# Patient Record
Sex: FEMALE | Race: WHITE | Employment: FULL TIME | URBAN - METROPOLITAN AREA
[De-identification: names, ages, dates, MRNs, and addresses within clinical notes are randomized per-mention and may not be internally consistent; named-entity substitution may affect disease eponyms.]

---

## 2017-02-01 ENCOUNTER — OFFICE VISIT (OUTPATIENT)
Dept: SURGERY | Facility: CLINIC | Age: 42
End: 2017-02-01

## 2017-02-01 VITALS
BODY MASS INDEX: 23.86 KG/M2 | RESPIRATION RATE: 16 BRPM | HEIGHT: 67 IN | WEIGHT: 152 LBS | HEART RATE: 82 BPM | TEMPERATURE: 98 F | DIASTOLIC BLOOD PRESSURE: 72 MMHG | SYSTOLIC BLOOD PRESSURE: 113 MMHG

## 2017-02-01 DIAGNOSIS — N30.10 INTERSTITIAL CYSTITIS: Primary | ICD-10-CM

## 2017-02-01 PROCEDURE — 99212 OFFICE O/P EST SF 10 MIN: CPT | Performed by: UROLOGY

## 2017-02-01 PROCEDURE — 51701 INSERT BLADDER CATHETER: CPT | Performed by: UROLOGY

## 2017-02-01 PROCEDURE — 99244 OFF/OP CNSLTJ NEW/EST MOD 40: CPT | Performed by: UROLOGY

## 2017-02-01 RX ORDER — NITROFURANTOIN 25; 75 MG/1; MG/1
100 CAPSULE ORAL NIGHTLY
Qty: 30 CAPSULE | Refills: 11 | Status: SHIPPED | OUTPATIENT
Start: 2017-02-01 | End: 2018-01-25 | Stop reason: ALTCHOICE

## 2017-02-01 NOTE — PROGRESS NOTES
[facility] Patient Status:  Outpatient    1975 MRN [id number]   Location [facility] Attending Megan Crane. [address] Day #  PCP No primary care provider on file.        UROLOGICAL CO primary care physician December 17, 2016 with a normal urinalysis at the same time patient is sexually active she states at times she can have mild pain with intercourse not usually though she states she has not had new onset sexual activity and she does s history of valvular heart disease, arrhythmias, rheumatic fever, dvt or PE, chest pain, shortness of breath or ankle swelling. The patient exercises regularly.   4.   The patient denies pulmonary complaints of cough, asthma, emphysema, bronchitis, pneumoni Temp: 98 °F (36.7 °C)   TempSrc: Oral   Resp: 16   Height: 5' 7\" (1.702 m)   Weight: 152 lb (68.947 kg)       The patient is a well-developed, well-nourished, white female in no apparent distress, appropriate for her stated age, alert, oriented x 3 and and then repeated 2013 asymptomatic for 3 years and now he has recurrence at length we discussed the benefits risks complications side effects reasons for nature of alternatives to cystoscopy hydrodistention patient wishes to proceed with this.   Finally pa

## 2017-02-02 ENCOUNTER — TELEPHONE (OUTPATIENT)
Dept: FAMILY MEDICINE CLINIC | Facility: CLINIC | Age: 42
End: 2017-02-02

## 2017-02-02 DIAGNOSIS — Z12.39 BREAST CANCER SCREENING, HIGH RISK PATIENT: Primary | ICD-10-CM

## 2017-02-02 DIAGNOSIS — R92.2 DENSE BREASTS: ICD-10-CM

## 2017-02-03 NOTE — TELEPHONE ENCOUNTER
Left voicemail for patient to inform her that Dr Quinton Spears is out of the office and her request will be handled upon his return.

## 2017-02-06 NOTE — TELEPHONE ENCOUNTER
Rory Reed, I am not certain I am ordering the correct type of mammogram screening for this pt you have evaluated for high risk breast cancer and dense breasts.  Is it tomogram screening mammogram? Your consult note adds \"3D\" but the only choices for that

## 2017-02-07 ENCOUNTER — TELEPHONE (OUTPATIENT)
Dept: FAMILY MEDICINE CLINIC | Facility: CLINIC | Age: 42
End: 2017-02-07

## 2017-02-07 NOTE — TELEPHONE ENCOUNTER
Refer to 2/2/17 orders call also. Will mail to patient. Patient informed. Demographics corrected. She lives in [facility].

## 2017-02-16 NOTE — TELEPHONE ENCOUNTER
WINTCALEX for pt. Please let her know [facility] approved 3D mammo order she can call Central Scheduling at [phone] to schedule appt.

## 2017-02-24 ENCOUNTER — TELEPHONE (OUTPATIENT)
Dept: SURGERY | Facility: CLINIC | Age: 42
End: 2017-02-24

## 2017-02-24 DIAGNOSIS — R30.0 DYSURIA: Primary | ICD-10-CM

## 2017-02-24 NOTE — TELEPHONE ENCOUNTER
Spoke to patient. Relayed Dr. Jodie Gallardo message below to patient. Patient is aware to submit urine specimen at lab first, then she can take Macrobid 100 mg by mouth twice daily for 3 days, call Monday for results of urine tests.  Patient verbalized understand

## 2017-02-24 NOTE — TELEPHONE ENCOUNTER
Patient c/o dysuria, urgency with scant amount of urine only, bladder pressure/pain; onset 1 day ago. Patient states she had sexual activity this past weekend and did not take the Macrobid as prescribed.    Patient states she normally takes Macrobid 100 mg

## 2017-02-24 NOTE — TELEPHONE ENCOUNTER
Please contact patient and if she wants she can simply take 3 days of Macrobid 100 mg twice daily.   We will also await results of urinalysis and culture as recommended by nursing staff which I agree with

## 2017-02-24 NOTE — TELEPHONE ENCOUNTER
pt called. She has IC, she had an sexual encounter last weekend and thinks she has an infection coming on. Feeling irritated in vaginal area. She asked if she could take more then one Macrobid for the next few days. Please advise.

## 2017-02-27 ENCOUNTER — APPOINTMENT (OUTPATIENT)
Dept: LAB | Facility: HOSPITAL | Age: 42
End: 2017-02-27
Attending: UROLOGY
Payer: COMMERCIAL

## 2017-02-27 DIAGNOSIS — R30.0 DYSURIA: ICD-10-CM

## 2017-02-27 LAB
BILIRUB UR QL: NEGATIVE
COLOR UR: YELLOW
GLUCOSE UR-MCNC: NEGATIVE MG/DL
KETONES UR-MCNC: NEGATIVE MG/DL
NITRITE UR QL STRIP.AUTO: NEGATIVE
PH UR: 5 [PH] (ref 5–8)
PROT UR-MCNC: NEGATIVE MG/DL
RBC #/AREA URNS AUTO: 4 /HPF
SP GR UR STRIP: 1.02 (ref 1–1.03)
UROBILINOGEN UR STRIP-ACNC: <2
VIT C UR-MCNC: NEGATIVE MG/DL
WBC #/AREA URNS AUTO: 28 /HPF

## 2017-02-27 PROCEDURE — 87086 URINE CULTURE/COLONY COUNT: CPT

## 2017-02-27 PROCEDURE — 81001 URINALYSIS AUTO W/SCOPE: CPT

## 2017-02-27 PROCEDURE — 87077 CULTURE AEROBIC IDENTIFY: CPT

## 2017-02-28 ENCOUNTER — TELEPHONE (OUTPATIENT)
Dept: SURGERY | Facility: CLINIC | Age: 42
End: 2017-02-28

## 2017-03-01 ENCOUNTER — TELEPHONE (OUTPATIENT)
Dept: SURGERY | Facility: CLINIC | Age: 42
End: 2017-03-01

## 2017-03-01 RX ORDER — SULFAMETHOXAZOLE AND TRIMETHOPRIM 800; 160 MG/1; MG/1
1 TABLET ORAL 2 TIMES DAILY
Qty: 14 TABLET | Refills: 0 | Status: SHIPPED | OUTPATIENT
Start: 2017-03-01 | End: 2017-03-08

## 2017-03-01 NOTE — TELEPHONE ENCOUNTER
----- Message from Demi Vargas MD sent at 3/1/2017  8:10 AM CST -----  Please contact patient with low level UTI although this is commonly a skin contaminant I would recommend short course of therapy Bactrim DS 1 p.o. twice daily #14 one refill can be c

## 2017-03-01 NOTE — TELEPHONE ENCOUNTER
Spoke with pt and informed her of [facility] results msg below and then I realized that [facility] did not send the bactrim ds and also pt is on nightly suppressive tx with macrobid 100mg 1 tab nightly.  [facility] happened to be standing nearby so I I placed pt on hold and juan

## 2017-03-03 ENCOUNTER — HOSPITAL ENCOUNTER (OUTPATIENT)
Dept: MAMMOGRAPHY | Facility: HOSPITAL | Age: 42
Discharge: HOME | End: 2017-03-03
Attending: FAMILY MEDICINE
Payer: COMMERCIAL

## 2017-03-03 DIAGNOSIS — R92.2 DENSE BREASTS: ICD-10-CM

## 2017-03-03 DIAGNOSIS — Z12.39 BREAST CANCER SCREENING, HIGH RISK PATIENT: ICD-10-CM

## 2017-03-03 PROCEDURE — 77066 DX MAMMO INCL CAD BI: CPT | Performed by: RADIOLOGY

## 2017-03-03 PROCEDURE — 77062 BREAST TOMOSYNTHESIS BI: CPT | Performed by: RADIOLOGY

## 2017-03-04 ENCOUNTER — TELEPHONE (OUTPATIENT)
Dept: FAMILY MEDICINE CLINIC | Facility: CLINIC | Age: 42
End: 2017-03-04

## 2017-03-04 NOTE — TELEPHONE ENCOUNTER
----- Message from Benji Bella MD sent at 3/3/2017 12:54 PM CST -----  Call pt. Mammogram shows no evidence of breast cancer. She has multiple cysts, these are NOT new.  Due to having dense breasts, she should get automated whole breast ultrasound wit

## 2017-03-07 ENCOUNTER — TELEPHONE (OUTPATIENT)
Dept: SURGERY | Facility: CLINIC | Age: 42
End: 2017-03-07

## 2017-03-07 DIAGNOSIS — N30.00 ACUTE CYSTITIS WITHOUT HEMATURIA: Primary | ICD-10-CM

## 2017-03-07 NOTE — TELEPHONE ENCOUNTER
It is a antibiotic she had a reaction to it broke out in hives, patient is scheduled for surg Friday with dr Oumou Khan, please advise and call patient

## 2017-03-08 ENCOUNTER — APPOINTMENT (OUTPATIENT)
Dept: LAB | Facility: HOSPITAL | Age: 42
End: 2017-03-08
Attending: UROLOGY
Payer: COMMERCIAL

## 2017-03-08 ENCOUNTER — TELEPHONE (OUTPATIENT)
Dept: SURGERY | Facility: CLINIC | Age: 42
End: 2017-03-08

## 2017-03-08 DIAGNOSIS — N30.00 ACUTE CYSTITIS WITHOUT HEMATURIA: ICD-10-CM

## 2017-03-08 LAB
BILIRUB UR QL: NEGATIVE
COLOR UR: YELLOW
GLUCOSE UR-MCNC: NEGATIVE MG/DL
HGB UR QL STRIP.AUTO: NEGATIVE
KETONES UR-MCNC: 20 MG/DL
NITRITE UR QL STRIP.AUTO: NEGATIVE
PH UR: 6 [PH] (ref 5–8)
PROT UR-MCNC: NEGATIVE MG/DL
RBC #/AREA URNS AUTO: 2 /HPF
SP GR UR STRIP: 1.01 (ref 1–1.03)
UROBILINOGEN UR STRIP-ACNC: <2
VIT C UR-MCNC: NEGATIVE MG/DL
WBC #/AREA URNS AUTO: 2 /HPF

## 2017-03-08 PROCEDURE — 87086 URINE CULTURE/COLONY COUNT: CPT

## 2017-03-08 PROCEDURE — 81001 URINALYSIS AUTO W/SCOPE: CPT

## 2017-03-08 NOTE — TELEPHONE ENCOUNTER
Please contact patient and 5 days of Bactrim is probably enough to treat urinary tract infection however we would have to paulina patient allergic to Bactrim and not use it any longer in the future.   Patient could come in today and submit repeat UA and cultur

## 2017-03-08 NOTE — TELEPHONE ENCOUNTER
Pt returning Rn's call re: allergic reaction to meds. Pt states Rn was waiting for 's response on what the next step will be? Please advise, thank you.  ( please see closed TE 3/07)

## 2017-03-08 NOTE — TELEPHONE ENCOUNTER
I spoke with pt and informed her of [facility]'s response msg below and and she said that she will try to get to the lab at the [facility] this evening because she will need her son to drive her because she took another Benadryl.  I asked if she was having symptoms still

## 2017-03-08 NOTE — TELEPHONE ENCOUNTER
Spoke to patient. Patient c/o hives, itchy hives all over body; onset this morning. Patient states the only thing new is that she started Bactrim DS about 5 days ago for low level UTI. Patient states she has two more days left of antibiotic to take.   Yaneth

## 2017-03-09 NOTE — H&P
[facility]    PATIENT'S NAME: Sofyadevon Jemal   ATTENDING PHYSICIAN: Brook Mukherjee.  Gerhardt Ditty, MD   PATIENT ACCOUNT#:   [de-identified]    LOCATION:  [facility]  MEDICAL RECORD #:   [id number]       YOB: 1975  ADMISSION DATE:       03/10/2017    HI tubal ligation in her 25s. MEDICATIONS:  Vitamin D and multivitamin. ALLERGIES:  Penicillin, tramadol, caused itching.     SOCIAL HISTORY:  She was a previous smoker, 1/2 a pack a day for 10 years, quitting 2 years ago; however, restarted about a year

## 2017-03-09 NOTE — TELEPHONE ENCOUNTER
Contacted patient. Relayed Dr. Briana Jones message below to patient. Patient verbalized understanding. All questions answered.

## 2017-03-09 NOTE — TELEPHONE ENCOUNTER
Please see 3/7/17 Telephone Encounter for MD's order and plan. Patient is calling for her repeat urinalysis and urine culture results.

## 2017-03-09 NOTE — TELEPHONE ENCOUNTER
Please contact patient that urinalysis is much improved so I think we can proceed with cystoscopy culture however not available as of yet it probably at least will have a preliminary in the morning prior to surgery but nothing available today.   Nonetheless

## 2017-03-10 ENCOUNTER — ANESTHESIA EVENT (OUTPATIENT)
Dept: SURGERY | Facility: HOSPITAL | Age: 42
End: 2017-03-10
Payer: COMMERCIAL

## 2017-03-10 ENCOUNTER — SURGERY (OUTPATIENT)
Age: 42
End: 2017-03-10

## 2017-03-10 ENCOUNTER — HOSPITAL ENCOUNTER (OUTPATIENT)
Facility: HOSPITAL | Age: 42
Setting detail: HOSPITAL OUTPATIENT SURGERY
Discharge: HOME | End: 2017-03-10
Attending: UROLOGY | Admitting: UROLOGY
Payer: COMMERCIAL

## 2017-03-10 ENCOUNTER — ANESTHESIA (OUTPATIENT)
Dept: SURGERY | Facility: HOSPITAL | Age: 42
End: 2017-03-10
Payer: COMMERCIAL

## 2017-03-10 VITALS
OXYGEN SATURATION: 99 % | HEART RATE: 64 BPM | BODY MASS INDEX: 22.13 KG/M2 | DIASTOLIC BLOOD PRESSURE: 56 MMHG | SYSTOLIC BLOOD PRESSURE: 91 MMHG | WEIGHT: 141 LBS | HEIGHT: 67 IN | TEMPERATURE: 97 F | RESPIRATION RATE: 14 BRPM

## 2017-03-10 DIAGNOSIS — N30.10 INTERSTITIAL CYSTITIS: Primary | ICD-10-CM

## 2017-03-10 LAB — B-HCG UR QL: NEGATIVE

## 2017-03-10 PROCEDURE — 52260 CYSTOSCOPY AND TREATMENT: CPT | Performed by: UROLOGY

## 2017-03-10 PROCEDURE — 0T7B8ZZ DILATION OF BLADDER, VIA NATURAL OR ARTIFICIAL OPENING ENDOSCOPIC: ICD-10-PCS | Performed by: UROLOGY

## 2017-03-10 RX ORDER — HYDROMORPHONE HYDROCHLORIDE 1 MG/ML
0.6 INJECTION, SOLUTION INTRAMUSCULAR; INTRAVENOUS; SUBCUTANEOUS EVERY 5 MIN PRN
Status: DISCONTINUED | OUTPATIENT
Start: 2017-03-10 | End: 2017-03-10

## 2017-03-10 RX ORDER — METOCLOPRAMIDE 10 MG/1
10 TABLET ORAL ONCE
Status: DISCONTINUED | OUTPATIENT
Start: 2017-03-10 | End: 2017-03-10 | Stop reason: HOSPADM

## 2017-03-10 RX ORDER — HYDROCODONE BITARTRATE AND ACETAMINOPHEN 5; 325 MG/1; MG/1
2 TABLET ORAL AS NEEDED
Status: DISCONTINUED | OUTPATIENT
Start: 2017-03-10 | End: 2017-03-10

## 2017-03-10 RX ORDER — MORPHINE SULFATE 4 MG/ML
4 INJECTION, SOLUTION INTRAMUSCULAR; INTRAVENOUS EVERY 10 MIN PRN
Status: DISCONTINUED | OUTPATIENT
Start: 2017-03-10 | End: 2017-03-10

## 2017-03-10 RX ORDER — LIDOCAINE HYDROCHLORIDE 20 MG/ML
JELLY TOPICAL AS NEEDED
Status: DISCONTINUED | OUTPATIENT
Start: 2017-03-10 | End: 2017-03-10 | Stop reason: HOSPADM

## 2017-03-10 RX ORDER — KETOROLAC TROMETHAMINE 30 MG/ML
INJECTION, SOLUTION INTRAMUSCULAR; INTRAVENOUS AS NEEDED
Status: DISCONTINUED | OUTPATIENT
Start: 2017-03-10 | End: 2017-03-10 | Stop reason: SURG

## 2017-03-10 RX ORDER — FAMOTIDINE 20 MG/1
20 TABLET ORAL ONCE
Status: DISCONTINUED | OUTPATIENT
Start: 2017-03-10 | End: 2017-03-10 | Stop reason: HOSPADM

## 2017-03-10 RX ORDER — MORPHINE SULFATE 2 MG/ML
2 INJECTION, SOLUTION INTRAMUSCULAR; INTRAVENOUS EVERY 10 MIN PRN
Status: DISCONTINUED | OUTPATIENT
Start: 2017-03-10 | End: 2017-03-10

## 2017-03-10 RX ORDER — HYDROMORPHONE HYDROCHLORIDE 1 MG/ML
0.2 INJECTION, SOLUTION INTRAMUSCULAR; INTRAVENOUS; SUBCUTANEOUS EVERY 5 MIN PRN
Status: DISCONTINUED | OUTPATIENT
Start: 2017-03-10 | End: 2017-03-10

## 2017-03-10 RX ORDER — LEVOFLOXACIN 5 MG/ML
500 INJECTION, SOLUTION INTRAVENOUS EVERY 24 HOURS
Status: DISCONTINUED | OUTPATIENT
Start: 2017-03-10 | End: 2017-03-10 | Stop reason: HOSPADM

## 2017-03-10 RX ORDER — HYDROCODONE BITARTRATE AND ACETAMINOPHEN 5; 325 MG/1; MG/1
1 TABLET ORAL AS NEEDED
Status: DISCONTINUED | OUTPATIENT
Start: 2017-03-10 | End: 2017-03-10

## 2017-03-10 RX ORDER — SODIUM CHLORIDE, SODIUM LACTATE, POTASSIUM CHLORIDE, CALCIUM CHLORIDE 600; 310; 30; 20 MG/100ML; MG/100ML; MG/100ML; MG/100ML
INJECTION, SOLUTION INTRAVENOUS CONTINUOUS PRN
Status: DISCONTINUED | OUTPATIENT
Start: 2017-03-10 | End: 2017-03-10 | Stop reason: SURG

## 2017-03-10 RX ORDER — ACETAMINOPHEN 325 MG/1
650 TABLET ORAL ONCE
Status: DISCONTINUED | OUTPATIENT
Start: 2017-03-10 | End: 2017-03-10 | Stop reason: HOSPADM

## 2017-03-10 RX ORDER — SODIUM CHLORIDE, SODIUM LACTATE, POTASSIUM CHLORIDE, CALCIUM CHLORIDE 600; 310; 30; 20 MG/100ML; MG/100ML; MG/100ML; MG/100ML
INJECTION, SOLUTION INTRAVENOUS CONTINUOUS
Status: DISCONTINUED | OUTPATIENT
Start: 2017-03-10 | End: 2017-03-10

## 2017-03-10 RX ORDER — DEXAMETHASONE SODIUM PHOSPHATE 4 MG/ML
VIAL (ML) INJECTION AS NEEDED
Status: DISCONTINUED | OUTPATIENT
Start: 2017-03-10 | End: 2017-03-10 | Stop reason: SURG

## 2017-03-10 RX ORDER — LIDOCAINE HYDROCHLORIDE 10 MG/ML
INJECTION, SOLUTION EPIDURAL; INFILTRATION; INTRACAUDAL; PERINEURAL AS NEEDED
Status: DISCONTINUED | OUTPATIENT
Start: 2017-03-10 | End: 2017-03-10 | Stop reason: SURG

## 2017-03-10 RX ORDER — HYDROMORPHONE HYDROCHLORIDE 1 MG/ML
0.4 INJECTION, SOLUTION INTRAMUSCULAR; INTRAVENOUS; SUBCUTANEOUS EVERY 5 MIN PRN
Status: DISCONTINUED | OUTPATIENT
Start: 2017-03-10 | End: 2017-03-10

## 2017-03-10 RX ORDER — MORPHINE SULFATE 10 MG/ML
6 INJECTION, SOLUTION INTRAMUSCULAR; INTRAVENOUS EVERY 10 MIN PRN
Status: DISCONTINUED | OUTPATIENT
Start: 2017-03-10 | End: 2017-03-10

## 2017-03-10 RX ORDER — ONDANSETRON 2 MG/ML
INJECTION INTRAMUSCULAR; INTRAVENOUS AS NEEDED
Status: DISCONTINUED | OUTPATIENT
Start: 2017-03-10 | End: 2017-03-10 | Stop reason: SURG

## 2017-03-10 RX ORDER — MIDAZOLAM HYDROCHLORIDE 1 MG/ML
INJECTION INTRAMUSCULAR; INTRAVENOUS AS NEEDED
Status: DISCONTINUED | OUTPATIENT
Start: 2017-03-10 | End: 2017-03-10 | Stop reason: SURG

## 2017-03-10 RX ORDER — NALOXONE HYDROCHLORIDE 0.4 MG/ML
80 INJECTION, SOLUTION INTRAMUSCULAR; INTRAVENOUS; SUBCUTANEOUS AS NEEDED
Status: DISCONTINUED | OUTPATIENT
Start: 2017-03-10 | End: 2017-03-10

## 2017-03-10 RX ORDER — ONDANSETRON 2 MG/ML
4 INJECTION INTRAMUSCULAR; INTRAVENOUS ONCE AS NEEDED
Status: DISCONTINUED | OUTPATIENT
Start: 2017-03-10 | End: 2017-03-10

## 2017-03-10 RX ORDER — CIPROFLOXACIN 500 MG/1
500 TABLET, FILM COATED ORAL 2 TIMES DAILY
Qty: 10 TABLET | Refills: 0 | Status: SHIPPED | OUTPATIENT
Start: 2017-03-10 | End: 2017-03-15

## 2017-03-10 RX ORDER — GLYCOPYRROLATE 0.2 MG/ML
INJECTION INTRAMUSCULAR; INTRAVENOUS AS NEEDED
Status: DISCONTINUED | OUTPATIENT
Start: 2017-03-10 | End: 2017-03-10 | Stop reason: SURG

## 2017-03-10 RX ADMIN — SODIUM CHLORIDE, SODIUM LACTATE, POTASSIUM CHLORIDE, CALCIUM CHLORIDE: 600; 310; 30; 20 INJECTION, SOLUTION INTRAVENOUS at 09:07:00

## 2017-03-10 RX ADMIN — MIDAZOLAM HYDROCHLORIDE 1 MG: 1 INJECTION INTRAMUSCULAR; INTRAVENOUS at 09:07:00

## 2017-03-10 RX ADMIN — LIDOCAINE HYDROCHLORIDE 50 MG: 10 INJECTION, SOLUTION EPIDURAL; INFILTRATION; INTRACAUDAL; PERINEURAL at 09:09:00

## 2017-03-10 RX ADMIN — GLYCOPYRROLATE 0.2 MG: 0.2 INJECTION INTRAMUSCULAR; INTRAVENOUS at 09:09:00

## 2017-03-10 RX ADMIN — KETOROLAC TROMETHAMINE 30 MG: 30 INJECTION, SOLUTION INTRAMUSCULAR; INTRAVENOUS at 09:07:00

## 2017-03-10 RX ADMIN — ONDANSETRON 4 MG: 2 INJECTION INTRAMUSCULAR; INTRAVENOUS at 09:09:00

## 2017-03-10 RX ADMIN — SODIUM CHLORIDE, SODIUM LACTATE, POTASSIUM CHLORIDE, CALCIUM CHLORIDE: 600; 310; 30; 20 INJECTION, SOLUTION INTRAVENOUS at 09:40:00

## 2017-03-10 RX ADMIN — DEXAMETHASONE SODIUM PHOSPHATE 4 MG: 4 MG/ML VIAL (ML) INJECTION at 09:09:00

## 2017-03-10 NOTE — ANESTHESIA POSTPROCEDURE EVALUATION
Patient: Doug Arceo    Procedure Summary     Date Anesthesia Start Anesthesia Stop Room / Location    03/10/17 0906  [facility] OR 14 / [facility] OR       Procedure Diagnosis Surgeon Responsible Provider    CYSTOSCOPY (N/A ) (interstitial cystitis) Cinel,

## 2017-03-10 NOTE — OPERATIVE REPORT
[facility] OPERATING ROOM  Operative Note     Camacho Adis Location: OR   CSN [id number] MRN [id number]   Admission Date 3/10/2017 Operation Date 3/10/2017   Attending Physician Donnetta Burkitt, MD Operating Physician Christiano Martinez.  Melanie Mc MD      Preoperat stones noted ureteral orifices were normal in size and position with clear reflux at this point keeping the sheath and we took the lens out of the cystoscope and performed hydrodistention yet which point stream of the irrigation stopped at 1100 cc this was

## 2017-03-10 NOTE — INTERVAL H&P NOTE
Pre-op Diagnosis: interstitial cystitis    The above referenced H&P was reviewed by Magnus Veras. Aria Ospina MD on 3/10/2017, the patient was examined and no significant changes have occurred in the patient's condition since the H&P was performed.   I discussed with

## 2017-03-10 NOTE — ANESTHESIA PREPROCEDURE EVALUATION
Anesthesia PreOp Note    HPI:     Davey Hargrove is a 42year old female who presents for preoperative consultation requested by: Jake Martin MD    Date of Surgery: 3/10/2017    Procedure(s):  CYSTOSCOPY  Indication: interstitial cystitis    * No Diagno Intravenous Q24H Christiano Martin MD     No current [facility]-ordered outpatient prescriptions on file.       Bactrim Ds [Sulfame*    Hives, Itching  Penicillins               Tramadol                Itching    Family History   Problem Relation Age of Onset   • B Patient      I have informed Elisabet Parker  of the nature of the anesthetic plan, benefits, risks, major complications, and any alternative forms of anesthetic management. All of the patient's questions were answered to the best of my ability.  The patien

## 2017-03-11 ENCOUNTER — HOSPITAL ENCOUNTER (EMERGENCY)
Facility: HOSPITAL | Age: 42
Discharge: HOME | End: 2017-03-11
Attending: EMERGENCY MEDICINE
Payer: COMMERCIAL

## 2017-03-11 VITALS
TEMPERATURE: 99 F | HEIGHT: 67 IN | SYSTOLIC BLOOD PRESSURE: 93 MMHG | BODY MASS INDEX: 22.6 KG/M2 | DIASTOLIC BLOOD PRESSURE: 52 MMHG | HEART RATE: 64 BPM | RESPIRATION RATE: 18 BRPM | OXYGEN SATURATION: 100 % | WEIGHT: 144 LBS

## 2017-03-11 DIAGNOSIS — T78.40XA ALLERGIC REACTION, INITIAL ENCOUNTER: Primary | ICD-10-CM

## 2017-03-11 PROCEDURE — S0028 INJECTION, FAMOTIDINE, 20 MG: HCPCS | Performed by: EMERGENCY MEDICINE

## 2017-03-11 PROCEDURE — 96375 TX/PRO/DX INJ NEW DRUG ADDON: CPT

## 2017-03-11 PROCEDURE — 99284 EMERGENCY DEPT VISIT MOD MDM: CPT

## 2017-03-11 PROCEDURE — 96374 THER/PROPH/DIAG INJ IV PUSH: CPT

## 2017-03-11 RX ORDER — METHYLPREDNISOLONE SODIUM SUCCINATE 125 MG/2ML
125 INJECTION, POWDER, LYOPHILIZED, FOR SOLUTION INTRAMUSCULAR; INTRAVENOUS ONCE
Status: COMPLETED | OUTPATIENT
Start: 2017-03-11 | End: 2017-03-11

## 2017-03-11 RX ORDER — FAMOTIDINE 20 MG
20 TABLET ORAL 2 TIMES DAILY
Qty: 20 TABLET | Refills: 0 | Status: SHIPPED | OUTPATIENT
Start: 2017-03-11 | End: 2017-03-21

## 2017-03-11 RX ORDER — FAMOTIDINE 10 MG/ML
20 INJECTION, SOLUTION INTRAVENOUS ONCE
Status: COMPLETED | OUTPATIENT
Start: 2017-03-11 | End: 2017-03-11

## 2017-03-11 RX ORDER — PREDNISONE 20 MG/1
40 TABLET ORAL DAILY
Qty: 10 TABLET | Refills: 0 | Status: SHIPPED | OUTPATIENT
Start: 2017-03-11 | End: 2017-03-16

## 2017-03-11 RX ORDER — DIPHENHYDRAMINE HYDROCHLORIDE 50 MG/ML
50 INJECTION INTRAMUSCULAR; INTRAVENOUS ONCE
Status: COMPLETED | OUTPATIENT
Start: 2017-03-11 | End: 2017-03-11

## 2017-03-11 RX ORDER — CETIRIZINE HYDROCHLORIDE 10 MG/1
10 TABLET ORAL 2 TIMES DAILY
Qty: 20 TABLET | Refills: 0 | Status: SHIPPED | OUTPATIENT
Start: 2017-03-11 | End: 2017-03-21

## 2017-03-11 NOTE — ED INITIAL ASSESSMENT (HPI)
Hives to body since yesterday, now with facial swelling and swelling to lips. C/o throat tightness, now better.

## 2017-03-11 NOTE — ED NOTES
Pt was Dx with UTI and was given bactrim and she noticed a rash, then she had surgery the following day, then today woke up with facial swelling, throat swelling and hive all over her body. Pt took benadryl at home which helped relive some of her symptoms.

## 2017-03-11 NOTE — ED NOTES
Upon enertiong room pt states \"i feel like the hives are starting to come back, increased itching and burning. \"  Denies any respiratory distress

## 2017-03-12 NOTE — ED PROVIDER NOTES
Patient Seen in: [facility] Emergency Department    History   Patient presents with: Allergic Rxn Allergies (immune)      HPI    Patient presents complaining of allergic reaction symptoms. Generalized hives and itching.   Patient states symptoms Comment: occasionally    Drug Use: No            Other Topics            Concern  Caffeine Concern        Yes    Comment:coffee, 1x week        Review of Systems   Constitutional: Negative for fever and chills.    HENT: Negative for congestion and sore thro Skin is warm and dry. Rash noted. Generalized hives   Nursing note and vitals reviewed.       ED Course      Labs Reviewed - No data to display          MDM     Pulse Ox: 100%, Room air, Normal     Monitor Interpretation:   normal sinus rhythm    Differen

## 2017-04-14 ENCOUNTER — HOSPITAL ENCOUNTER (EMERGENCY)
Facility: HOSPITAL | Age: 42
Discharge: HOME | End: 2017-04-14
Attending: PHYSICIAN ASSISTANT
Payer: COMMERCIAL

## 2017-04-14 ENCOUNTER — APPOINTMENT (OUTPATIENT)
Dept: CT IMAGING | Facility: HOSPITAL | Age: 42
End: 2017-04-14
Attending: PHYSICIAN ASSISTANT
Payer: COMMERCIAL

## 2017-04-14 ENCOUNTER — TELEPHONE (OUTPATIENT)
Dept: FAMILY MEDICINE CLINIC | Facility: CLINIC | Age: 42
End: 2017-04-14

## 2017-04-14 VITALS
DIASTOLIC BLOOD PRESSURE: 64 MMHG | HEIGHT: 67 IN | SYSTOLIC BLOOD PRESSURE: 108 MMHG | WEIGHT: 140 LBS | OXYGEN SATURATION: 99 % | BODY MASS INDEX: 21.97 KG/M2 | TEMPERATURE: 98 F | HEART RATE: 68 BPM | RESPIRATION RATE: 20 BRPM

## 2017-04-14 DIAGNOSIS — R11.2 NAUSEA AND VOMITING IN ADULT: ICD-10-CM

## 2017-04-14 DIAGNOSIS — R51.9 ACUTE NONINTRACTABLE HEADACHE, UNSPECIFIED HEADACHE TYPE: Primary | ICD-10-CM

## 2017-04-14 DIAGNOSIS — S09.90XA CLOSED HEAD INJURY WITHOUT LOSS OF CONSCIOUSNESS, INITIAL ENCOUNTER: ICD-10-CM

## 2017-04-14 PROCEDURE — 70450 CT HEAD/BRAIN W/O DYE: CPT

## 2017-04-14 PROCEDURE — 96375 TX/PRO/DX INJ NEW DRUG ADDON: CPT

## 2017-04-14 PROCEDURE — 96374 THER/PROPH/DIAG INJ IV PUSH: CPT

## 2017-04-14 PROCEDURE — 96361 HYDRATE IV INFUSION ADD-ON: CPT

## 2017-04-14 PROCEDURE — 81025 URINE PREGNANCY TEST: CPT

## 2017-04-14 PROCEDURE — 99284 EMERGENCY DEPT VISIT MOD MDM: CPT

## 2017-04-14 RX ORDER — METOCLOPRAMIDE HYDROCHLORIDE 5 MG/ML
10 INJECTION INTRAMUSCULAR; INTRAVENOUS ONCE
Status: COMPLETED | OUTPATIENT
Start: 2017-04-14 | End: 2017-04-14

## 2017-04-14 RX ORDER — ONDANSETRON 4 MG/1
4 TABLET, ORALLY DISINTEGRATING ORAL EVERY 6 HOURS PRN
Qty: 10 TABLET | Refills: 0 | Status: SHIPPED | OUTPATIENT
Start: 2017-04-14 | End: 2017-04-17

## 2017-04-14 RX ORDER — KETOROLAC TROMETHAMINE 30 MG/ML
30 INJECTION, SOLUTION INTRAMUSCULAR; INTRAVENOUS ONCE
Status: COMPLETED | OUTPATIENT
Start: 2017-04-14 | End: 2017-04-14

## 2017-04-14 RX ORDER — ONDANSETRON 2 MG/ML
4 INJECTION INTRAMUSCULAR; INTRAVENOUS ONCE
Status: COMPLETED | OUTPATIENT
Start: 2017-04-14 | End: 2017-04-14

## 2017-04-14 RX ORDER — BUTALBITAL, ACETAMINOPHEN AND CAFFEINE 50; 325; 40 MG/1; MG/1; MG/1
1-2 TABLET ORAL EVERY 6 HOURS PRN
Qty: 14 TABLET | Refills: 0 | Status: SHIPPED | OUTPATIENT
Start: 2017-04-14 | End: 2017-04-21

## 2017-04-14 RX ORDER — DIPHENHYDRAMINE HYDROCHLORIDE 50 MG/ML
50 INJECTION INTRAMUSCULAR; INTRAVENOUS ONCE
Status: COMPLETED | OUTPATIENT
Start: 2017-04-14 | End: 2017-04-14

## 2017-04-14 NOTE — TELEPHONE ENCOUNTER
Pt will find someone to drive her to ED this morning or this afternoon around 3pm.  Clinical staff to make f/u call tomorrow. Pt alert with clear speech during our phone conversation.

## 2017-04-14 NOTE — TELEPHONE ENCOUNTER
Pt states that she banged her head on wall this Wednesday. Pt states that she has pain on the head and it throbs occasionally. Pt states that at times she is just sitting and the throb starts again.  Per pt states that she does not have a lump but is concer

## 2017-04-14 NOTE — TELEPHONE ENCOUNTER
Actions Requested: OV, however, pt was referred to the ED  Situation/Background   Problem: Hit head on the wall, having intermittent throbbing pain since    Onset: Wednesday   Associated Symptoms: Nausea, no emesis. Intermittent dizziness.    History of Kenneth

## 2017-04-14 NOTE — ED NOTES
Pt to ER with c/o rt sided headache and nausea/vomiting since Wed. Pt states she was playing around on her bed playing with her dog and hit her head on the wall behind her bed. Pt denies LOC. Pt also c/o intermittent dizziness.  Pt ambulating with steady ga

## 2017-04-14 NOTE — ED INITIAL ASSESSMENT (HPI)
Patient reports she accidentally hit her head against a wall on Wednesday, denies LOC, but felt foggy after.  Still with throbbing headache, nausea    Took tylenol at 7 am

## 2017-04-14 NOTE — ED PROVIDER NOTES
Patient Seen in: [facility] Emergency Department    History   Patient presents with:  Headache (neurologic)    Stated Complaint: Headache    HPI    70-year-old female presents with chief complaint of right-sided headache. Onset 2 days ago.   Joshua ovarian ca?    • Cancer Maternal Uncle 46     d.47; lung ca, smoker         Smoking Status: Current Every Day Smoker        Packs/Day: 0.50  Years:           Types: Cigarettes    Alcohol Use: Yes                Comment: occasionally      Review of Systems rebound tenderness or guarding. No organomegaly is noted. No guarding or peritoneal signs. Genitourinary: Not examined. Lymphatic: No gross lymphadenopathy noted. Musculoskeletal: Musculoskeletal system is grossly intact. There is no obvious deformity. 0

## 2017-04-15 NOTE — TELEPHONE ENCOUNTER
Actions Requested: S for [facility] , patient would like to know if she could get a different prescription for headache. Has f/u appt with j[facility] on Wednesday.  persistent headache, nausea, following head injury on 4/13/17, treated in ER 4/14/17 butalbital, zofran, hours of operation and will call back if needed. Patient was able to restate instructions in their own words, verbalizes understanding and agrees with plan.

## 2017-04-15 NOTE — TELEPHONE ENCOUNTER
Pt notified of below. Advised adequate hydration with advil. Pt verbalized understanding and denies questions at this time.

## 2017-04-15 NOTE — TELEPHONE ENCOUNTER
At this point I would CPM, ,also take 25mg benadryl twice daily sometimes that helps. May take 600mg advil three times daily if not nauseated.

## 2017-05-01 ENCOUNTER — OFFICE VISIT (OUTPATIENT)
Dept: FAMILY MEDICINE CLINIC | Facility: CLINIC | Age: 42
End: 2017-05-01

## 2017-05-01 ENCOUNTER — TELEPHONE (OUTPATIENT)
Dept: FAMILY MEDICINE CLINIC | Facility: CLINIC | Age: 42
End: 2017-05-01

## 2017-05-01 VITALS
RESPIRATION RATE: 16 BRPM | TEMPERATURE: 98 F | BODY MASS INDEX: 23.37 KG/M2 | HEART RATE: 78 BPM | DIASTOLIC BLOOD PRESSURE: 72 MMHG | HEIGHT: 65.5 IN | SYSTOLIC BLOOD PRESSURE: 111 MMHG | WEIGHT: 142 LBS

## 2017-05-01 DIAGNOSIS — R11.0 NAUSEA: ICD-10-CM

## 2017-05-01 DIAGNOSIS — Z80.3 FAMILY HISTORY OF BREAST CANCER: ICD-10-CM

## 2017-05-01 DIAGNOSIS — E55.9 VITAMIN D DEFICIENCY: ICD-10-CM

## 2017-05-01 DIAGNOSIS — N30.10 INTERSTITIAL CYSTITIS: ICD-10-CM

## 2017-05-01 DIAGNOSIS — Z00.00 ANNUAL PHYSICAL EXAM: Primary | ICD-10-CM

## 2017-05-01 DIAGNOSIS — F17.200 TOBACCO USE DISORDER: ICD-10-CM

## 2017-05-01 PROCEDURE — 99396 PREV VISIT EST AGE 40-64: CPT | Performed by: FAMILY MEDICINE

## 2017-05-01 RX ORDER — ONDANSETRON 8 MG/1
8 TABLET, ORALLY DISINTEGRATING ORAL EVERY 8 HOURS PRN
Qty: 30 TABLET | Refills: 3 | Status: SHIPPED | OUTPATIENT
Start: 2017-05-01 | End: 2018-01-25 | Stop reason: ALTCHOICE

## 2017-05-01 NOTE — PROGRESS NOTES
HPI:    Patient ID: Marvin Bacon is a 36year old female. Patient presents with:  Physical    HPI  Had allergic reaction before cystoscopy, Bactrim DS, and went to ER, face blew up. Head injury a few wk ago, hit head while playing with dog.    Had a mil tingle on sitting too long   Psychiatric/Behavioral: Negative for sleep disturbance and depressed mood. Current Outpatient Prescriptions:  Nitrofurantoin Monohyd Macro (MACROBID) 100 MG Oral Cap Take 1 capsule (100 mg total) by mouth nightly. diagnosis)  Interstitial cystitis  Nausea  Tobacco use disorder  Vitamin d deficiency  Family history of breast cancer  1. Annual physical exam  Will order labs, rtc fasting    2. Interstitial cystitis  Is being treated by urologist - Dr Carson Lagunas    3.  Nausea

## 2017-05-01 NOTE — TELEPHONE ENCOUNTER
Javy, I wanted to let you know that since you put Priti Seen on daily Macrobid for cystitis prevention she has had marked loss of appetite and some nausea. Would there be any other daily antibiotics she might use? She is allergic to sulfa and pencillin.

## 2017-05-02 NOTE — TELEPHONE ENCOUNTER
Suppressive therapy is usually with either daily Macrobid or daily Bactrim DS she has a sulfa allergy. Therefore I do not have much of the option for suppressive antibiotics.   Patient could simply come off suppressive therapy and treat UTIs on an as-neede

## 2017-05-03 NOTE — TELEPHONE ENCOUNTER
Yes, I want her to stay on the med in fact. If labs are all normal she can still stay on med as long as she is able to eat enough to maintain good nutrition and will be able to turn sideways without becoming invisible (ha ha).

## 2017-05-03 NOTE — TELEPHONE ENCOUNTER
Pt advised of recommendations below and she verbalized understanding, she states since being on the macrobid she has not had a uti and she would hate to discontinue and treat every uti, she asks if staying on the macrobid and using zofran for the nausea is

## 2017-05-03 NOTE — TELEPHONE ENCOUNTER
Pt informed of Dr BRYANT's response below and pt agrees with plan. Denies further questions/concerns at this time.

## 2017-05-03 NOTE — TELEPHONE ENCOUNTER
Let pt know that I spoke with Dr. Rosa Maria Rhodes, her urologist. The options would be to continue the preventive abx, Macrobid, or stop it and treat each individual UTI with a 7 day course of the abx each episode.  The only other suppressive abx used in this situati

## 2017-05-06 ENCOUNTER — LAB ENCOUNTER (OUTPATIENT)
Dept: LAB | Facility: HOSPITAL | Age: 42
End: 2017-05-06
Attending: FAMILY MEDICINE
Payer: COMMERCIAL

## 2017-05-06 DIAGNOSIS — Z00.00 ANNUAL PHYSICAL EXAM: ICD-10-CM

## 2017-05-06 PROCEDURE — 80053 COMPREHEN METABOLIC PANEL: CPT

## 2017-05-06 PROCEDURE — 82306 VITAMIN D 25 HYDROXY: CPT

## 2017-05-06 PROCEDURE — 81001 URINALYSIS AUTO W/SCOPE: CPT

## 2017-05-06 PROCEDURE — 85025 COMPLETE CBC W/AUTO DIFF WBC: CPT

## 2017-05-06 PROCEDURE — 80061 LIPID PANEL: CPT

## 2017-05-06 PROCEDURE — 36415 COLL VENOUS BLD VENIPUNCTURE: CPT

## 2017-05-06 PROCEDURE — 84443 ASSAY THYROID STIM HORMONE: CPT

## 2017-09-11 ENCOUNTER — OFFICE VISIT (OUTPATIENT)
Dept: OBGYN CLINIC | Facility: CLINIC | Age: 42
End: 2017-09-11

## 2017-09-11 ENCOUNTER — TELEPHONE (OUTPATIENT)
Dept: OBGYN CLINIC | Facility: CLINIC | Age: 42
End: 2017-09-11

## 2017-09-11 ENCOUNTER — APPOINTMENT (OUTPATIENT)
Dept: LAB | Facility: HOSPITAL | Age: 42
End: 2017-09-11
Attending: OBSTETRICS & GYNECOLOGY
Payer: COMMERCIAL

## 2017-09-11 VITALS
WEIGHT: 141 LBS | SYSTOLIC BLOOD PRESSURE: 108 MMHG | BODY MASS INDEX: 22.66 KG/M2 | HEIGHT: 66.25 IN | HEART RATE: 65 BPM | DIASTOLIC BLOOD PRESSURE: 69 MMHG

## 2017-09-11 DIAGNOSIS — R10.2 PELVIC PAIN: ICD-10-CM

## 2017-09-11 DIAGNOSIS — R10.32 LLQ PAIN: ICD-10-CM

## 2017-09-11 DIAGNOSIS — N92.3 INTERMENSTRUAL BLEEDING: ICD-10-CM

## 2017-09-11 DIAGNOSIS — Z12.4 SCREENING FOR MALIGNANT NEOPLASM OF CERVIX: ICD-10-CM

## 2017-09-11 DIAGNOSIS — N92.3 INTERMENSTRUAL BLEEDING: Primary | ICD-10-CM

## 2017-09-11 LAB
B-HCG UR QL: NEGATIVE
BILIRUB UR QL: NEGATIVE
COLOR UR: YELLOW
GLUCOSE UR-MCNC: NEGATIVE MG/DL
LEUKOCYTE ESTERASE UR QL STRIP.AUTO: NEGATIVE
NITRITE UR QL STRIP.AUTO: NEGATIVE
PH UR: 5 [PH] (ref 5–8)
PROT UR-MCNC: NEGATIVE MG/DL
RBC #/AREA URNS AUTO: 6 /HPF
SP GR UR STRIP: 1.03 (ref 1–1.03)
UROBILINOGEN UR STRIP-ACNC: 2
VIT C UR-MCNC: NEGATIVE MG/DL
WBC #/AREA URNS AUTO: 1 /HPF

## 2017-09-11 PROCEDURE — 81001 URINALYSIS AUTO W/SCOPE: CPT

## 2017-09-11 PROCEDURE — 36415 COLL VENOUS BLD VENIPUNCTURE: CPT

## 2017-09-11 PROCEDURE — 81025 URINE PREGNANCY TEST: CPT

## 2017-09-11 PROCEDURE — 99213 OFFICE O/P EST LOW 20 MIN: CPT | Performed by: OBSTETRICS & GYNECOLOGY

## 2017-09-11 PROCEDURE — 84443 ASSAY THYROID STIM HORMONE: CPT

## 2017-09-11 NOTE — H&P
HPI:  The patient is a 37 yo F who presents c/o pain after IC on Saturday. Patitent reports 5/10 dull pain in the LLQ that occurred after intercourse. Pain only with deep penetration. Occasionally has PCB.   She does note that the pain has been occurr Patti Comings Mother    • Cancer Maternal Uncle 64     tongue cancer-cause of death; d.57, ex-smoker   • Other [OTHER] Brother      intellectual disability   • Ovarian Cancer Maternal Aunt 45     d.45; ?cervical vs ovarian ca? • Cancer Maternal Uncle 46     d. problem. Diagnoses and all orders for this visit:    Intermenstrual bleeding  -     US PELVIS (TRANSVAGINAL PELVIS) (CPT=76830); Future  -     Cancel: TSH W REFLEX TO FREE T4; Future  -     CHLAMYDIA/GONOCOCCUS, ROSITA;  Future  -     TRICH VAG BY ROSITA; Futu

## 2017-09-13 LAB
C TRACH DNA SPEC QL NAA+PROBE: NEGATIVE
HPV I/H RISK 1 DNA SPEC QL NAA+PROBE: NEGATIVE
N GONORRHOEA DNA SPEC QL NAA+PROBE: NEGATIVE
T VAGINALIS RRNA SPEC QL NAA+PROBE: NEGATIVE

## 2017-09-19 ENCOUNTER — TELEPHONE (OUTPATIENT)
Dept: OBGYN CLINIC | Facility: CLINIC | Age: 42
End: 2017-09-19

## 2017-09-19 NOTE — TELEPHONE ENCOUNTER
----- Message from Fred Georges DO sent at 9/16/2017  1:01 PM CDT -----  Pap/hpv/gc/ct/trich neg.   Please notify of results

## 2017-09-26 ENCOUNTER — HOSPITAL ENCOUNTER (OUTPATIENT)
Dept: ULTRASOUND IMAGING | Facility: HOSPITAL | Age: 42
Discharge: HOME | End: 2017-09-26
Attending: OBSTETRICS & GYNECOLOGY
Payer: COMMERCIAL

## 2017-09-26 DIAGNOSIS — R10.32 LLQ PAIN: ICD-10-CM

## 2017-09-26 DIAGNOSIS — N92.3 INTERMENSTRUAL BLEEDING: ICD-10-CM

## 2017-09-26 PROCEDURE — 93975 VASCULAR STUDY: CPT | Performed by: OBSTETRICS & GYNECOLOGY

## 2017-09-26 PROCEDURE — 76830 TRANSVAGINAL US NON-OB: CPT | Performed by: OBSTETRICS & GYNECOLOGY

## 2017-09-26 PROCEDURE — 76856 US EXAM PELVIC COMPLETE: CPT | Performed by: OBSTETRICS & GYNECOLOGY

## 2017-09-27 ENCOUNTER — TELEPHONE (OUTPATIENT)
Dept: OBGYN CLINIC | Facility: CLINIC | Age: 42
End: 2017-09-27

## 2017-09-27 RX ORDER — ACETAMINOPHEN AND CODEINE PHOSPHATE 120; 12 MG/5ML; MG/5ML
0.35 SOLUTION ORAL DAILY
Qty: 28 TABLET | Refills: 11 | Status: SHIPPED | OUTPATIENT
Start: 2017-09-27 | End: 2017-10-25

## 2017-09-27 NOTE — TELEPHONE ENCOUNTER
Reviewed US  Results with patient. No sign of why  Patient having pain. She had IC last week and no PCB, however given intermenstrual bleeding would recommend considering OCs. She isn't a candidate for COCPs due to smoking history.   She also reports her

## 2017-10-07 ENCOUNTER — TELEPHONE (OUTPATIENT)
Dept: OBGYN CLINIC | Facility: CLINIC | Age: 42
End: 2017-10-07

## 2017-10-08 NOTE — TELEPHONE ENCOUNTER
Spoke with Drew Will after speaking with Dr. Carito Canela regarding safety of OCs. Dr Carito Canela states okay for POPs. NorqD was sent to [employer]. She will start with her next menses. Discussed daily usage and timing.

## 2017-10-23 ENCOUNTER — APPOINTMENT (OUTPATIENT)
Dept: CT IMAGING | Facility: HOSPITAL | Age: 42
End: 2017-10-23
Attending: EMERGENCY MEDICINE
Payer: COMMERCIAL

## 2017-10-23 ENCOUNTER — HOSPITAL ENCOUNTER (EMERGENCY)
Facility: HOSPITAL | Age: 42
Discharge: HOME | End: 2017-10-23
Attending: EMERGENCY MEDICINE
Payer: COMMERCIAL

## 2017-10-23 ENCOUNTER — NURSE TRIAGE (OUTPATIENT)
Dept: OTHER | Age: 42
End: 2017-10-23

## 2017-10-23 VITALS
WEIGHT: 140 LBS | HEART RATE: 73 BPM | DIASTOLIC BLOOD PRESSURE: 78 MMHG | TEMPERATURE: 99 F | RESPIRATION RATE: 17 BRPM | HEIGHT: 67 IN | OXYGEN SATURATION: 98 % | SYSTOLIC BLOOD PRESSURE: 113 MMHG | BODY MASS INDEX: 21.97 KG/M2

## 2017-10-23 DIAGNOSIS — R10.9 ABDOMINAL PAIN, ACUTE: Primary | ICD-10-CM

## 2017-10-23 PROCEDURE — 96374 THER/PROPH/DIAG INJ IV PUSH: CPT

## 2017-10-23 PROCEDURE — 81025 URINE PREGNANCY TEST: CPT

## 2017-10-23 PROCEDURE — 85025 COMPLETE CBC W/AUTO DIFF WBC: CPT | Performed by: EMERGENCY MEDICINE

## 2017-10-23 PROCEDURE — 81001 URINALYSIS AUTO W/SCOPE: CPT | Performed by: EMERGENCY MEDICINE

## 2017-10-23 PROCEDURE — 99284 EMERGENCY DEPT VISIT MOD MDM: CPT

## 2017-10-23 PROCEDURE — 80048 BASIC METABOLIC PNL TOTAL CA: CPT | Performed by: EMERGENCY MEDICINE

## 2017-10-23 PROCEDURE — 96375 TX/PRO/DX INJ NEW DRUG ADDON: CPT

## 2017-10-23 PROCEDURE — 74177 CT ABD & PELVIS W/CONTRAST: CPT | Performed by: EMERGENCY MEDICINE

## 2017-10-23 RX ORDER — ONDANSETRON 2 MG/ML
4 INJECTION INTRAMUSCULAR; INTRAVENOUS ONCE
Status: COMPLETED | OUTPATIENT
Start: 2017-10-23 | End: 2017-10-23

## 2017-10-23 RX ORDER — MORPHINE SULFATE 2 MG/ML
2 INJECTION, SOLUTION INTRAMUSCULAR; INTRAVENOUS ONCE
Status: COMPLETED | OUTPATIENT
Start: 2017-10-23 | End: 2017-10-23

## 2017-10-23 NOTE — TELEPHONE ENCOUNTER
Action Requested: Summary for Provider     []  Critical Lab, Recommendations Needed  [] Need Additional Advice  []   FYI    []   Need Orders  [] Need Medications Sent to Pharmacy  []  Other     SUMMARY: Advised pt to go to ER for evaluation.     Pt states s

## 2017-10-24 NOTE — ED PROVIDER NOTES
Patient Seen in: [facility] Emergency Department    History   Patient presents with:  Abdomen/Flank Pain (GI/)    Stated Complaint: Right lower abd pain.  Gallbladder removed    HPI    62-year-old female with history of cholecystitis, interstitia for pain, discharge and redness. Respiratory: Negative for cough, shortness of breath and wheezing. Cardiovascular: Negative for chest pain. Gastrointestinal: Positive for abdominal pain and nausea. Negative for diarrhea and vomiting.    Karley Garcias and time. 5/5 strength in b/l UEs and LEs, normal sensation in all extremities, normal finger to nose b/l, normal gait, no facial asymmetry, normal speech     Skin: Skin is warm and dry. No rash noted. She is not diaphoretic.    Psychiatric: She has a nor Green Auto Resulted    -RAINBOW DRAW LIGHT GREEN   Collection Time: 10/23/17  8:57 PM   Result Value Ref Range   Hold Lt Green Auto Resulted    -RAINBOW DRAW GOLD   Collection Time: 10/23/17  8:57 PM   Result Value Ref Range   Hold Gold Auto Resulted    -R pain and nausea  - I personally reviewed and interpreted all the ED vitals  - afebrile, hemodynamically stable  - I ordered and personally reviewed the labs and imaging and found electrolytes reassuring, no anemia, no leukocytosis, no bacteriuria, CT witho

## 2017-10-25 ENCOUNTER — TELEPHONE (OUTPATIENT)
Dept: GASTROENTEROLOGY | Facility: CLINIC | Age: 42
End: 2017-10-25

## 2017-10-25 ENCOUNTER — APPOINTMENT (OUTPATIENT)
Dept: LAB | Facility: HOSPITAL | Age: 42
End: 2017-10-25
Attending: INTERNAL MEDICINE
Payer: COMMERCIAL

## 2017-10-25 ENCOUNTER — OFFICE VISIT (OUTPATIENT)
Dept: GASTROENTEROLOGY | Facility: CLINIC | Age: 42
End: 2017-10-25

## 2017-10-25 VITALS
TEMPERATURE: 98 F | DIASTOLIC BLOOD PRESSURE: 72 MMHG | HEIGHT: 66.25 IN | BODY MASS INDEX: 21.8 KG/M2 | WEIGHT: 135.63 LBS | SYSTOLIC BLOOD PRESSURE: 102 MMHG | HEART RATE: 72 BPM

## 2017-10-25 DIAGNOSIS — R10.31 RLQ ABDOMINAL PAIN: Primary | ICD-10-CM

## 2017-10-25 DIAGNOSIS — R10.31 RLQ ABDOMINAL PAIN: ICD-10-CM

## 2017-10-25 PROCEDURE — 99204 OFFICE O/P NEW MOD 45 MIN: CPT | Performed by: INTERNAL MEDICINE

## 2017-10-25 PROCEDURE — 80053 COMPREHEN METABOLIC PANEL: CPT

## 2017-10-25 PROCEDURE — 36415 COLL VENOUS BLD VENIPUNCTURE: CPT

## 2017-10-25 PROCEDURE — 99212 OFFICE O/P EST SF 10 MIN: CPT | Performed by: INTERNAL MEDICINE

## 2017-10-25 RX ORDER — PEG-3350, SODIUM SULFATE, SODIUM CHLORIDE, POTASSIUM CHLORIDE, SODIUM ASCORBATE AND ASCORBIC ACID 7.5-2.691G
KIT ORAL
Qty: 1 EACH | Refills: 0 | Status: SHIPPED | OUTPATIENT
Start: 2017-10-25 | End: 2017-11-14 | Stop reason: ALTCHOICE

## 2017-10-25 NOTE — PATIENT INSTRUCTIONS
1. Schedule colonoscopy with Iv/conscious sedation with Dr. Eduardo Flores    2.  bowel prep from pharmacy (split moviprep )    3. Continue all medications for procedure    4. Read all bowel prep instructions carefully    5.  AVOID seeds, nuts, popcorn, r

## 2017-10-25 NOTE — H&P
[facility] - Gastroenterology                                                                                                  Clinic History and Physical Types: Cigarettes  Smokeless tobacco: Current User                    Alcohol use:  Yes              Comment: occasionally       Medications (Active prior to today's visit):    Current Outpatient Prescriptions:  PEG-KCl-NaCl-NaSulf-Na Asc-C (MOVIPREP) without diarrhea seems less likely infectious, though worth further investigation with colonoscopy. If not colon related, discussed the possibilities of constipation, abdominal wall pain or potentially back/spine related given the description.     [place]

## 2017-10-25 NOTE — TELEPHONE ENCOUNTER
Scheduled for:  Colonoscopy 04128  Provider Name: Sam Alvarez  Date: 11/16/17   Location:  [facility]  Sedation:  IVCS  Time:  0730 / ARRIVAL 0630  Prep: MOVIPREP  Meds/Allergies Reconciled?:  Physician reviewed  Diagnosis with codes:  RLQ abdominal pain R10.31

## 2017-10-26 ENCOUNTER — TELEPHONE (OUTPATIENT)
Dept: GASTROENTEROLOGY | Facility: CLINIC | Age: 42
End: 2017-10-26

## 2017-10-26 DIAGNOSIS — R19.7 DIARRHEA, UNSPECIFIED TYPE: Primary | ICD-10-CM

## 2017-10-26 NOTE — TELEPHONE ENCOUNTER
Pt contacted and states she had a BM this morning that was dark black and \"very very soft. \"     Then had scrambled eggs, coffee, and green tea for breakfast and soon after she had right sided flank pain rated 11/10- she had another BM which was semi-form

## 2017-10-26 NOTE — TELEPHONE ENCOUNTER
Pt. States that she is concerned about having black soft stool this morning and just know pt. Had dark diarrhea, which had blood in it. Please call to discuss.

## 2017-10-26 NOTE — TELEPHONE ENCOUNTER
Please get stool studies (c.diff, stool culture, giardia).  If continued bleeding for days or multiple times in one day or dizziness/lightheadedness, chest pain, shortness of breath, uncontrollable/untolerable abdominal pain, recommend ED evaluation    [facility]

## 2017-10-26 NOTE — TELEPHONE ENCOUNTER
Pt contacted and reviewed Dr. Kassandra Buttp message below, she verbalized understanding and will go to the [facility] to get specimen cups tomorrow morning. She understands that if sxs worsen, she needs to present to the ED for further evaluation.

## 2017-10-27 ENCOUNTER — LAB ENCOUNTER (OUTPATIENT)
Dept: LAB | Age: 42
End: 2017-10-27
Attending: INTERNAL MEDICINE
Payer: COMMERCIAL

## 2017-10-27 DIAGNOSIS — R19.7 DIARRHEA, UNSPECIFIED TYPE: ICD-10-CM

## 2017-10-27 PROCEDURE — 87045 FECES CULTURE AEROBIC BACT: CPT

## 2017-10-27 PROCEDURE — 87046 STOOL CULTR AEROBIC BACT EA: CPT

## 2017-10-27 PROCEDURE — 87493 C DIFF AMPLIFIED PROBE: CPT

## 2017-10-27 PROCEDURE — 87329 GIARDIA AG IA: CPT

## 2017-10-27 PROCEDURE — 87272 CRYPTOSPORIDIUM AG IF: CPT

## 2017-10-27 PROCEDURE — 87427 SHIGA-LIKE TOXIN AG IA: CPT

## 2017-10-30 ENCOUNTER — TELEPHONE (OUTPATIENT)
Dept: GASTROENTEROLOGY | Facility: CLINIC | Age: 42
End: 2017-10-30

## 2017-10-30 NOTE — TELEPHONE ENCOUNTER
Please let the patient know her stool tests show no infection. Please find out if she is still having any blood or dark stools. If so we may need to re-check her CBC. Otherwise, plan as discussed in clinic.     Please reiterate if continued bleeding f

## 2017-10-30 NOTE — TELEPHONE ENCOUNTER
Pt contacted and reviewed Dr. Dennis Math message below. She states that over the weekend she only had 1-2 BMs and \"they seemed normal, and the pain subsided on the side to a level like a 1 or 2 and it is not constant anymore. \"    I reiterated that if sxs worsen

## 2017-11-10 ENCOUNTER — TELEPHONE (OUTPATIENT)
Dept: UROLOGY | Facility: HOSPITAL | Age: 42
End: 2017-11-10

## 2017-11-10 NOTE — TELEPHONE ENCOUNTER
Referred by Dr. Schafer Speak for IC, patient has had hydrodistension with cysto in the past, takes Macrobid daily,

## 2017-11-14 ENCOUNTER — OFFICE VISIT (OUTPATIENT)
Dept: UROLOGY | Facility: HOSPITAL | Age: 42
End: 2017-11-14
Attending: OBSTETRICS & GYNECOLOGY
Payer: COMMERCIAL

## 2017-11-14 VITALS
WEIGHT: 135 LBS | BODY MASS INDEX: 21.69 KG/M2 | HEIGHT: 66.25 IN | DIASTOLIC BLOOD PRESSURE: 60 MMHG | SYSTOLIC BLOOD PRESSURE: 102 MMHG

## 2017-11-14 DIAGNOSIS — R35.0 URINARY FREQUENCY: Primary | ICD-10-CM

## 2017-11-14 DIAGNOSIS — N94.10 FEMALE DYSPAREUNIA: ICD-10-CM

## 2017-11-14 DIAGNOSIS — M62.838 LEVATOR SPASM: ICD-10-CM

## 2017-11-14 DIAGNOSIS — N30.10 INTERSTITIAL CYSTITIS: ICD-10-CM

## 2017-11-14 DIAGNOSIS — N81.84 PELVIC MUSCLE WASTING: ICD-10-CM

## 2017-11-14 PROCEDURE — 99201 HC OUTPT EVAL AND MGNT NEW PT LEVEL 1: CPT

## 2017-11-14 RX ORDER — NORETHINDRONE 0.35 MG/1
TABLET ORAL
Refills: 11 | COMMUNITY
Start: 2017-10-28 | End: 2019-08-08

## 2017-11-14 NOTE — PROGRESS NOTES
Merari Vinson DO  11/14/2017     Referred by Dr. Pamela Smith    Patient presents with:  Pelvic Pain: referred by Dr. Pamela Smith, dx IC 15 yrs ago, had hydrodistentions every 5 yrs but didnt last, last was 6mos      HPI:  No MAGNUS  No UUI  No prolapse  +dyspa Visit:  ondansetron 8 MG Oral Tablet Dispersible Take 1 tablet (8 mg total) by mouth every 8 (eight) hours as needed for Nausea.  Disp: 30 tablet Rfl: 3   Nitrofurantoin Monohyd Macro (MACROBID) 100 MG Oral Cap Take 1 capsule (100 mg total) by mouth nightly REHAB    (M62.838) Levator spasm  Plan: OP REFERRAL TO [facility]    (N81.84) Pelvic muscle wasting      Discussion Items:   Urodynamics and cystoscopy for evaluation of LUTS  Behavioral and pharmacologic treatments for OAB  Nonsurgica

## 2017-11-16 ENCOUNTER — SURGERY (OUTPATIENT)
Age: 42
End: 2017-11-16

## 2017-11-16 ENCOUNTER — HOSPITAL ENCOUNTER (OUTPATIENT)
Facility: HOSPITAL | Age: 42
Setting detail: HOSPITAL OUTPATIENT SURGERY
Discharge: HOME | End: 2017-11-16
Attending: INTERNAL MEDICINE | Admitting: INTERNAL MEDICINE
Payer: COMMERCIAL

## 2017-11-16 ENCOUNTER — TELEPHONE (OUTPATIENT)
Dept: GASTROENTEROLOGY | Facility: CLINIC | Age: 42
End: 2017-11-16

## 2017-11-16 VITALS
WEIGHT: 135 LBS | HEIGHT: 66 IN | DIASTOLIC BLOOD PRESSURE: 70 MMHG | HEART RATE: 73 BPM | OXYGEN SATURATION: 98 % | SYSTOLIC BLOOD PRESSURE: 80 MMHG | BODY MASS INDEX: 21.69 KG/M2 | RESPIRATION RATE: 16 BRPM

## 2017-11-16 DIAGNOSIS — R10.31 RLQ ABDOMINAL PAIN: ICD-10-CM

## 2017-11-16 PROCEDURE — 0DBN8ZX EXCISION OF SIGMOID COLON, VIA NATURAL OR ARTIFICIAL OPENING ENDOSCOPIC, DIAGNOSTIC: ICD-10-PCS | Performed by: INTERNAL MEDICINE

## 2017-11-16 PROCEDURE — 99152 MOD SED SAME PHYS/QHP 5/>YRS: CPT | Performed by: INTERNAL MEDICINE

## 2017-11-16 PROCEDURE — 0DBM8ZX EXCISION OF DESCENDING COLON, VIA NATURAL OR ARTIFICIAL OPENING ENDOSCOPIC, DIAGNOSTIC: ICD-10-PCS | Performed by: INTERNAL MEDICINE

## 2017-11-16 PROCEDURE — 99153 MOD SED SAME PHYS/QHP EA: CPT | Performed by: INTERNAL MEDICINE

## 2017-11-16 PROCEDURE — 45385 COLONOSCOPY W/LESION REMOVAL: CPT | Performed by: INTERNAL MEDICINE

## 2017-11-16 RX ORDER — SODIUM CHLORIDE, SODIUM LACTATE, POTASSIUM CHLORIDE, CALCIUM CHLORIDE 600; 310; 30; 20 MG/100ML; MG/100ML; MG/100ML; MG/100ML
INJECTION, SOLUTION INTRAVENOUS CONTINUOUS
Status: DISCONTINUED | OUTPATIENT
Start: 2017-11-16 | End: 2017-11-16

## 2017-11-16 RX ORDER — MIDAZOLAM HYDROCHLORIDE 1 MG/ML
1 INJECTION INTRAMUSCULAR; INTRAVENOUS EVERY 5 MIN PRN
Status: DISCONTINUED | OUTPATIENT
Start: 2017-11-16 | End: 2017-11-16

## 2017-11-16 RX ORDER — MIDAZOLAM HYDROCHLORIDE 1 MG/ML
INJECTION INTRAMUSCULAR; INTRAVENOUS
Status: DISCONTINUED | OUTPATIENT
Start: 2017-11-16 | End: 2017-11-16

## 2017-11-16 RX ORDER — SODIUM CHLORIDE 0.9 % (FLUSH) 0.9 %
10 SYRINGE (ML) INJECTION AS NEEDED
Status: DISCONTINUED | OUTPATIENT
Start: 2017-11-16 | End: 2017-11-16

## 2017-11-16 RX ORDER — DIPHENHYDRAMINE HYDROCHLORIDE 50 MG/ML
INJECTION INTRAMUSCULAR; INTRAVENOUS
Status: DISCONTINUED | OUTPATIENT
Start: 2017-11-16 | End: 2017-11-16

## 2017-11-16 NOTE — TELEPHONE ENCOUNTER
Message   Received: Today   Message Contents   Mavis Chong MD  [facility] Clinical Staff             GI staff: please place recall for colonoscopy in 3 years      Recall letter mailed to patient and colon recall entered for 3 years per MG.

## 2017-11-16 NOTE — OPERATIVE REPORT
Colonoscopy Report    Astoria Johnson HAIDER 1975 Age 36year old   PCP Bernadette Del Castillo MD Endoscopist Terry Gorman MD     Date of procedure: 17    Procedure: Colonoscopy w/ snare polypectomy    Pre-operative diagnosis: abdominal pain, abnorm endoscope was removed. The patient tolerated the procedure well. There were no immediate postoperative complications. The patient’s vital signs were monitored throughout the procedure and remained stable.     Estimated blood loss: insignificant    Specimens [phone].       Jenae Soto MD  [facility], [facility] - Gastroenterology  11/16/2017  8:21 AM

## 2017-11-16 NOTE — H&P
History & Physical Examination    Patient Name: Rachel Tolentino  MRN: [id number]  [facility]: [id number]  YOB: 1975    Diagnosis: abdominal pain, abnormal CT      Prescriptions Prior to Admission:  JOLIVETTE 0.35 MG Oral Tab  Disp:  Rfl: 11 11/15/2017 Exam is Normal If not normal, please explain:   ANGELICA [ Meron Stanley  [ Pearl Arzola [ Dearl Mode [ Audie Mcdaniel [ Bárbara Dears [ Liane Villa      I have discussed the risks and benefits and alternatives of the procedure with the patient/family.   They [place]

## 2017-12-05 ENCOUNTER — PATIENT MESSAGE (OUTPATIENT)
Dept: GASTROENTEROLOGY | Facility: CLINIC | Age: 42
End: 2017-12-05

## 2017-12-05 ENCOUNTER — OFFICE VISIT (OUTPATIENT)
Dept: GASTROENTEROLOGY | Facility: CLINIC | Age: 42
End: 2017-12-05

## 2017-12-05 VITALS
DIASTOLIC BLOOD PRESSURE: 73 MMHG | BODY MASS INDEX: 21.88 KG/M2 | WEIGHT: 139.38 LBS | SYSTOLIC BLOOD PRESSURE: 111 MMHG | HEIGHT: 67 IN | HEART RATE: 73 BPM

## 2017-12-05 DIAGNOSIS — K59.00 CONSTIPATION, UNSPECIFIED CONSTIPATION TYPE: ICD-10-CM

## 2017-12-05 DIAGNOSIS — R11.0 NAUSEA: Primary | ICD-10-CM

## 2017-12-05 DIAGNOSIS — Z86.010 HISTORY OF COLON POLYPS: ICD-10-CM

## 2017-12-05 PROCEDURE — 99212 OFFICE O/P EST SF 10 MIN: CPT | Performed by: INTERNAL MEDICINE

## 2017-12-05 PROCEDURE — 99213 OFFICE O/P EST LOW 20 MIN: CPT | Performed by: INTERNAL MEDICINE

## 2017-12-05 NOTE — PATIENT INSTRUCTIONS
1. Start miralax    Miralax  Start taking miralax (or generic equivalent) once a day, which you can buy over the counter. To take this, mix a 1/2 capful (17 g) of the powder in with any liquid.   If there is no improvement, you can increase to a full capfu

## 2017-12-05 NOTE — PROGRESS NOTES
[facility] - Gastroenterology                                                                                                  Clinic Progress Note    Patient pr Uncle 46     d.47; lung ca, smoker   • Breast Cancer Other       Social History: Smoking status: Current Every Day Smoker                                                   Packs/day: 0.50      Years: 0.00         Types: Cigarettes  Smokeless tobacco: Curre as well. Discussed nature of her colon polyps and recommendations for surveillance which she understands.     Recommend:  -miralax and squatty potty for constipation  -H2 blocker for nausea and heartburn  -colonoscopy in 3 years for polyp surveillance

## 2017-12-06 NOTE — TELEPHONE ENCOUNTER
From: Jalen Richards  To: Sincere Zapien MD  Sent: 12/5/2017 8:48 PM CST  Subject: Non-Urgent Medical Question    Hi Dr. Sharyle Flatness,    Just fyi:    I just wanted to let you know I looked at my results for the gene test i had done in 2016 and its the [facility]

## 2018-01-22 ENCOUNTER — APPOINTMENT (OUTPATIENT)
Dept: LAB | Facility: HOSPITAL | Age: 43
End: 2018-01-22
Attending: OBSTETRICS & GYNECOLOGY
Payer: COMMERCIAL

## 2018-01-22 ENCOUNTER — TELEPHONE (OUTPATIENT)
Dept: OBGYN CLINIC | Facility: CLINIC | Age: 43
End: 2018-01-22

## 2018-01-22 ENCOUNTER — TELEPHONE (OUTPATIENT)
Dept: UROLOGY | Facility: HOSPITAL | Age: 43
End: 2018-01-22

## 2018-01-22 DIAGNOSIS — R30.0 DYSURIA: ICD-10-CM

## 2018-01-22 DIAGNOSIS — R30.0 DYSURIA: Primary | ICD-10-CM

## 2018-01-22 DIAGNOSIS — R30.9 PAIN WITH URINATION: Primary | ICD-10-CM

## 2018-01-22 LAB
BACTERIA UR QL AUTO: NEGATIVE /HPF
BILIRUB UR QL: NEGATIVE
CLARITY UR: CLEAR
COLOR UR: COLORLESS
GLUCOSE UR-MCNC: NEGATIVE MG/DL
KETONES UR-MCNC: NEGATIVE MG/DL
LEUKOCYTE ESTERASE UR QL STRIP.AUTO: NEGATIVE
NITRITE UR QL STRIP.AUTO: NEGATIVE
PH UR: 7 [PH] (ref 5–8)
PROT UR-MCNC: NEGATIVE MG/DL
RBC #/AREA URNS AUTO: <1 /HPF
SP GR UR STRIP: 1 (ref 1–1.03)
UROBILINOGEN UR STRIP-ACNC: <2
VIT C UR-MCNC: NEGATIVE MG/DL
WBC #/AREA URNS AUTO: 1 /HPF

## 2018-01-22 PROCEDURE — 81001 URINALYSIS AUTO W/SCOPE: CPT

## 2018-01-22 PROCEDURE — 87086 URINE CULTURE/COLONY COUNT: CPT

## 2018-01-22 NOTE — TELEPHONE ENCOUNTER
Pt calling to report that since Saturday she has been experiencing pain with urination, irritation in bladder, and full bladder feeling after urination. Pt denies blood in urine or fevers. Pt reports slight back pain. Pt stated she was diagnosed with IC and is unsure if she is having a flare up or if this is a bladder infection. Pt advised to go to lab now for UA and call later for results. Pt advised to increase fluid intake. Pt verbalized understanding.

## 2018-01-22 NOTE — TELEPHONE ENCOUNTER
Pt called with c/o UTI symptoms, urgency, frequency, dysuria since Saturday, will order UA and culture, discussed with patient would like culture regardless of UA result to check for any growth and we will have a culture result on file, pt agree's to plan,

## 2018-01-23 ENCOUNTER — TELEPHONE (OUTPATIENT)
Dept: UROLOGY | Facility: HOSPITAL | Age: 43
End: 2018-01-23

## 2018-01-23 ENCOUNTER — TELEPHONE (OUTPATIENT)
Dept: OTHER | Age: 43
End: 2018-01-23

## 2018-01-23 DIAGNOSIS — Z12.39 SCREENING BREAST EXAMINATION: Primary | ICD-10-CM

## 2018-01-23 NOTE — TELEPHONE ENCOUNTER
Called patient and was trying to triage but noticed that last seen by Dr Jb Dias on 5/1/17 and then since then no Ov, Advised to make appointment to establish care first with the new PCP as Dr Jb Dias is no longer working with us , patient declines and h

## 2018-01-23 NOTE — TELEPHONE ENCOUNTER
Called pt and LVM with negative urine cx results, encouraged pt to call our office with condition update.

## 2018-01-23 NOTE — TELEPHONE ENCOUNTER
Pt called back, reports that she continues to have sx of painful, frequent urination. Discussed negative urine cx results. Pt reports she had a very stressful week last week and then started having sx on Saturday.   Reports taking Miralax every other day

## 2018-01-23 NOTE — TELEPHONE ENCOUNTER
Attempted to call pt but no answer. Chart reviewed and appears last mammogram was in march and recommending additional breast u/s due to dense breasts.  Dr Lamar Berg notes suggest the following:    She should get automated whole breast ultrasound with mammograms

## 2018-01-24 NOTE — TELEPHONE ENCOUNTER
Attempted to call pt but no answer. Chart reviewed and appears last mammogram was in march and recommending additional breast u/s due to dense breasts.  Dr Shayla Vickers notes suggest the following:    She should get automated whole breast ultrasound with mammograms

## 2018-01-24 NOTE — TELEPHONE ENCOUNTER
Message noted. May order testing and to follow up as noted in my note with either her gynecologist or breast MD- Dr Lawyer Herrera for follow up due to breast pains/ symptoms.

## 2018-01-24 NOTE — TELEPHONE ENCOUNTER
MJ. Also, sent Dr BERMEO orders to pt via active [place]. Late entry: mmg order mailed to pts' home address on file.

## 2018-01-25 ENCOUNTER — OFFICE VISIT (OUTPATIENT)
Dept: FAMILY MEDICINE CLINIC | Facility: CLINIC | Age: 43
End: 2018-01-25

## 2018-01-25 VITALS
HEART RATE: 76 BPM | WEIGHT: 140 LBS | BODY MASS INDEX: 22.5 KG/M2 | TEMPERATURE: 98 F | SYSTOLIC BLOOD PRESSURE: 115 MMHG | DIASTOLIC BLOOD PRESSURE: 82 MMHG | HEIGHT: 66.25 IN

## 2018-01-25 DIAGNOSIS — N30.10 INTERSTITIAL CYSTITIS: ICD-10-CM

## 2018-01-25 DIAGNOSIS — R92.2 DENSE BREASTS: Primary | ICD-10-CM

## 2018-01-25 PROCEDURE — 99214 OFFICE O/P EST MOD 30 MIN: CPT | Performed by: FAMILY MEDICINE

## 2018-01-25 PROCEDURE — 99212 OFFICE O/P EST SF 10 MIN: CPT | Performed by: FAMILY MEDICINE

## 2018-01-25 RX ORDER — ZOLPIDEM TARTRATE 10 MG/1
10 TABLET ORAL NIGHTLY
Qty: 30 TABLET | Refills: 1 | Status: SHIPPED | OUTPATIENT
Start: 2018-01-25 | End: 2018-03-09

## 2018-01-25 NOTE — PROGRESS NOTES
HPI:    Patient ID: Saroj Solitario is a 36year old female. Had uti all life. .  Since 15 years ago has inter cystitis. Now in physical therapy. Burning with urination. Back pain   Had colonoscopy recently needs in 3 years another one.    Constipatio Visit:  Signed Prescriptions Disp Refills    Zolpidem Tartrate 10 MG Oral Tab 30 tablet 1      Sig: Take 1 tablet (10 mg total) by mouth nightly.            Imaging & Referrals:  [facility] SAMEER 2D+3D DIAGNOSTIC [facility]  BILAT (CPT=[id number])  US BREAST BILAT WHOLE

## 2018-01-26 ENCOUNTER — TELEPHONE (OUTPATIENT)
Dept: FAMILY MEDICINE CLINIC | Facility: CLINIC | Age: 43
End: 2018-01-26

## 2018-01-26 NOTE — TELEPHONE ENCOUNTER
Spoke with patient, informed patient I will call in Zolpidem Tartrate to her pharmacy, verbalized understanding. Contacted pharmacy, medication called In as prescribed by Dr. Regan Grimes.

## 2018-01-26 NOTE — TELEPHONE ENCOUNTER
Pt states she had an office visit yesterday and was told by a nurse they would fax over her rx for Current Outpatient Prescriptions:  Zolpidem Tartrate 10 MG Oral Tab Take 1 tablet (10 mg total) by mouth nightly.  Disp: 30 tablet Rfl: 1     Pt states she ne

## 2018-02-05 ENCOUNTER — TELEPHONE (OUTPATIENT)
Dept: SURGERY | Facility: CLINIC | Age: 43
End: 2018-02-05

## 2018-02-05 NOTE — TELEPHONE ENCOUNTER
Pt asking is she can come in tomorrow - there is a cancellation - pt has appt for next week for breast mass - pain is getting worse now

## 2018-02-06 ENCOUNTER — OFFICE VISIT (OUTPATIENT)
Dept: SURGERY | Facility: CLINIC | Age: 43
End: 2018-02-06

## 2018-02-06 VITALS
WEIGHT: 140 LBS | DIASTOLIC BLOOD PRESSURE: 74 MMHG | SYSTOLIC BLOOD PRESSURE: 118 MMHG | BODY MASS INDEX: 22 KG/M2 | HEART RATE: 80 BPM

## 2018-02-06 DIAGNOSIS — Z80.3 FAMILY HISTORY OF BREAST CANCER: ICD-10-CM

## 2018-02-06 DIAGNOSIS — N60.01 CYST OF RIGHT BREAST: Primary | ICD-10-CM

## 2018-02-06 DIAGNOSIS — R92.2 DENSE BREAST: ICD-10-CM

## 2018-02-06 PROCEDURE — 19000 PUNCTURE ASPIR CYST BREAST: CPT | Performed by: SURGERY

## 2018-02-06 PROCEDURE — 99244 OFF/OP CNSLTJ NEW/EST MOD 40: CPT | Performed by: SURGERY

## 2018-02-06 PROCEDURE — 76942 ECHO GUIDE FOR BIOPSY: CPT | Performed by: SURGERY

## 2018-02-06 NOTE — PROGRESS NOTES
History and Physical      Ceci Aguilera is a 36year old female. HPI   Patient presents with:  Breast Lump: Patient here for right breast lumps. Was here in 2011 for aspiration of breast lump (right).   Patient states her pain is 7/10, and she can fee Occupational History  Occupation          Employer            Comment               Coding, billing                             Social History Main Topics    Smoking status: Current Every Day Smoker laterally - most tender around 10:00, skin ok, no LN or nipple d/c, small mass in L UOQ. Cysts and complex cysts or solid mass in R UOQ on US survey. DIAGNOSTICS  Tomosynthesis 3/3/17: cat d, stable masses B, multiple, R>L, benign ca++.       ASSESSM

## 2018-02-08 ENCOUNTER — HOSPITAL ENCOUNTER (OUTPATIENT)
Dept: MAMMOGRAPHY | Facility: HOSPITAL | Age: 43
Discharge: HOME | End: 2018-02-08
Attending: FAMILY MEDICINE
Payer: COMMERCIAL

## 2018-02-08 ENCOUNTER — NURSE NAVIGATOR ENCOUNTER (OUTPATIENT)
Dept: HEMATOLOGY/ONCOLOGY | Facility: HOSPITAL | Age: 43
End: 2018-02-08

## 2018-02-08 ENCOUNTER — HOSPITAL ENCOUNTER (OUTPATIENT)
Dept: ULTRASOUND IMAGING | Facility: HOSPITAL | Age: 43
Discharge: HOME | End: 2018-02-08
Attending: FAMILY MEDICINE
Payer: COMMERCIAL

## 2018-02-08 ENCOUNTER — TELEPHONE (OUTPATIENT)
Dept: FAMILY MEDICINE CLINIC | Facility: CLINIC | Age: 43
End: 2018-02-08

## 2018-02-08 ENCOUNTER — TELEPHONE (OUTPATIENT)
Dept: SURGERY | Facility: CLINIC | Age: 43
End: 2018-02-08

## 2018-02-08 DIAGNOSIS — R92.2 DENSE BREASTS: ICD-10-CM

## 2018-02-08 PROCEDURE — 77062 BREAST TOMOSYNTHESIS BI: CPT | Performed by: FAMILY MEDICINE

## 2018-02-08 PROCEDURE — 76642 ULTRASOUND BREAST LIMITED: CPT | Performed by: FAMILY MEDICINE

## 2018-02-08 PROCEDURE — 77066 DX MAMMO INCL CAD BI: CPT | Performed by: FAMILY MEDICINE

## 2018-02-08 NOTE — TELEPHONE ENCOUNTER
[facility] calling to confirm if Dr. OCAMPO received the order request faxed over for the Pt.

## 2018-02-08 NOTE — TELEPHONE ENCOUNTER
Pt. states that her PCP let her know per Mammogram results - R breast needs to aspirated, has 5-6 cysts. Pt. Does have a f/up appt. sched for 2/13.

## 2018-02-13 ENCOUNTER — NURSE NAVIGATOR ENCOUNTER (OUTPATIENT)
Dept: HEMATOLOGY/ONCOLOGY | Facility: HOSPITAL | Age: 43
End: 2018-02-13

## 2018-02-13 NOTE — PROGRESS NOTES
Received phone call from patient inquiring about her procedure (breast cyst aspiration) scheduling. Discussed pt will be receiving a call from Radiology scheduling once an order has been obtained. Pt acknowledged, thanked Navigator.       Navigator confir

## 2018-02-14 NOTE — TELEPHONE ENCOUNTER
[address] to f/u not sure if form was faxed yesterday. Wanting to confirm if it was since Dr. Jone Rucker documented she signed it. Voicemail left for Dana to call office back to confirm.

## 2018-02-15 ENCOUNTER — TELEPHONE (OUTPATIENT)
Dept: FAMILY MEDICINE CLINIC | Facility: CLINIC | Age: 43
End: 2018-02-15

## 2018-02-15 ENCOUNTER — HOSPITAL ENCOUNTER (OUTPATIENT)
Dept: ULTRASOUND IMAGING | Facility: HOSPITAL | Age: 43
Discharge: HOME | End: 2018-02-15
Attending: FAMILY MEDICINE
Payer: COMMERCIAL

## 2018-02-15 ENCOUNTER — HOSPITAL ENCOUNTER (OUTPATIENT)
Dept: MAMMOGRAPHY | Facility: HOSPITAL | Age: 43
Discharge: HOME | End: 2018-02-15
Attending: FAMILY MEDICINE
Payer: COMMERCIAL

## 2018-02-15 VITALS
BODY MASS INDEX: 21.19 KG/M2 | DIASTOLIC BLOOD PRESSURE: 66 MMHG | HEART RATE: 64 BPM | HEIGHT: 67 IN | WEIGHT: 135 LBS | SYSTOLIC BLOOD PRESSURE: 116 MMHG

## 2018-02-15 DIAGNOSIS — N60.01 CYST OF BREAST, RIGHT: ICD-10-CM

## 2018-02-15 PROCEDURE — 19001 PUNCTURE ASPIR CYST BRST EA: CPT

## 2018-02-15 PROCEDURE — 19000 PUNCTURE ASPIR CYST BREAST: CPT | Performed by: FAMILY MEDICINE

## 2018-02-15 PROCEDURE — 76942 ECHO GUIDE FOR BIOPSY: CPT | Performed by: FAMILY MEDICINE

## 2018-02-15 PROCEDURE — 19001 PUNCTURE ASPIR CYST BRST EA: CPT | Performed by: FAMILY MEDICINE

## 2018-02-15 PROCEDURE — 88173 CYTOPATH EVAL FNA REPORT: CPT | Performed by: FAMILY MEDICINE

## 2018-02-15 NOTE — PROCEDURES
[facility]  Procedure Note    Lisa Briones Patient Status:  Outpatient    1975 MRN [id number]   Location [address] [facility] Attending Lynnda Eisenmenger, MD   Hosp Day # 0 PCP Rosy Richey MD     Procedure: Chantel Ramírez

## 2018-02-15 NOTE — IMAGING NOTE
Mrs. Issa Members arrived to Ultrasound Room #4 at [address] [facility] Technologist, Shante Martinez at 56    HX TAKEN: Mrs. Briones reports history of cystic breasts first diagnosed 2010. Reports aspiration of the right breast cyst 2010.   Reports self-di discarded per Dr. Raysa Galdamez instruction. 1125:   PROCEDURE COMPLETED AREA CLEANED STERI STRIPS and  band aids applied to the sites. ICE PACK TO SITE       1130: POST INSTRUCTIONS GIVEN VERBAL ET WRITTEN. PT VERBALIZES UNDERSTANDING AND AGREEMENT .   A

## 2018-02-21 ENCOUNTER — NURSE NAVIGATOR ENCOUNTER (OUTPATIENT)
Dept: HEMATOLOGY/ONCOLOGY | Facility: HOSPITAL | Age: 43
End: 2018-02-21

## 2018-02-21 NOTE — PROGRESS NOTES
Called to follow-up with patient from 2/15/18 breast cyst aspiration. Message left for patient to call back.

## 2018-02-22 ENCOUNTER — NURSE NAVIGATOR ENCOUNTER (OUTPATIENT)
Dept: HEMATOLOGY/ONCOLOGY | Facility: HOSPITAL | Age: 43
End: 2018-02-22

## 2018-02-22 ENCOUNTER — MED REC SCAN ONLY (OUTPATIENT)
Dept: HEMATOLOGY/ONCOLOGY | Facility: HOSPITAL | Age: 43
End: 2018-02-22

## 2018-02-22 NOTE — PROGRESS NOTES
Pt returning Navigator's previous phone call. Pt discusses she is feeling better since the cyst aspiration last week.   Pt reports she still is feeling 1 lump, a \"few inches\" from the nipple, \"straight up\" from the nipple, on the Right Breast.  Navigat

## 2018-03-08 ENCOUNTER — NURSE TRIAGE (OUTPATIENT)
Dept: OTHER | Age: 43
End: 2018-03-08

## 2018-03-08 NOTE — TELEPHONE ENCOUNTER
Action Requested: Summary for Provider     []  Critical Lab, Recommendations Needed  [] Need Additional Advice  [x]   FYI    []   Need Orders  [x] Need Medications Sent to Pharmacy  []  Other     SUMMARY: Patient requesting appt this week with [facility] for naus

## 2018-03-09 ENCOUNTER — LAB ENCOUNTER (OUTPATIENT)
Dept: LAB | Age: 43
End: 2018-03-09
Attending: FAMILY MEDICINE
Payer: COMMERCIAL

## 2018-03-09 ENCOUNTER — OFFICE VISIT (OUTPATIENT)
Dept: FAMILY MEDICINE CLINIC | Facility: CLINIC | Age: 43
End: 2018-03-09

## 2018-03-09 VITALS
WEIGHT: 129 LBS | TEMPERATURE: 98 F | HEART RATE: 80 BPM | SYSTOLIC BLOOD PRESSURE: 108 MMHG | BODY MASS INDEX: 20 KG/M2 | DIASTOLIC BLOOD PRESSURE: 72 MMHG

## 2018-03-09 DIAGNOSIS — R10.9 ABDOMINAL DISCOMFORT: Primary | ICD-10-CM

## 2018-03-09 DIAGNOSIS — R10.9 ABDOMINAL DISCOMFORT: ICD-10-CM

## 2018-03-09 LAB
ALBUMIN SERPL BCP-MCNC: 4.3 G/DL (ref 3.5–4.8)
ALBUMIN/GLOB SERPL: 1.8 {RATIO} (ref 1–2)
ALP SERPL-CCNC: 31 U/L (ref 32–100)
ALT SERPL-CCNC: 13 U/L (ref 14–54)
AMYLASE SERPL-CCNC: 51 U/L (ref 24–108)
ANION GAP SERPL CALC-SCNC: 9 MMOL/L (ref 0–18)
AST SERPL-CCNC: 16 U/L (ref 15–41)
BASOPHILS # BLD: 0 K/UL (ref 0–0.2)
BASOPHILS NFR BLD: 0 %
BILIRUB SERPL-MCNC: 0.9 MG/DL (ref 0.3–1.2)
BUN SERPL-MCNC: 2 MG/DL (ref 8–20)
BUN/CREAT SERPL: 2.8 (ref 10–20)
CALCIUM SERPL-MCNC: 9.2 MG/DL (ref 8.5–10.5)
CHLORIDE SERPL-SCNC: 103 MMOL/L (ref 95–110)
CO2 SERPL-SCNC: 26 MMOL/L (ref 22–32)
CREAT SERPL-MCNC: 0.72 MG/DL (ref 0.5–1.5)
EOSINOPHIL # BLD: 0.1 K/UL (ref 0–0.7)
EOSINOPHIL NFR BLD: 2 %
ERYTHROCYTE [DISTWIDTH] IN BLOOD BY AUTOMATED COUNT: 13.3 % (ref 11–15)
GLOBULIN PLAS-MCNC: 2.4 G/DL (ref 2.5–3.7)
GLUCOSE SERPL-MCNC: 94 MG/DL (ref 70–99)
HCT VFR BLD AUTO: 40.1 % (ref 35–48)
HGB BLD-MCNC: 13.9 G/DL (ref 12–16)
LIPASE SERPL-CCNC: 20 U/L (ref 22–51)
LYMPHOCYTES # BLD: 3 K/UL (ref 1–4)
LYMPHOCYTES NFR BLD: 41 %
MCH RBC QN AUTO: 31 PG (ref 27–32)
MCHC RBC AUTO-ENTMCNC: 34.7 G/DL (ref 32–37)
MCV RBC AUTO: 89.5 FL (ref 80–100)
MONOCYTES # BLD: 0.7 K/UL (ref 0–1)
MONOCYTES NFR BLD: 9 %
NEUTROPHILS # BLD AUTO: 3.5 K/UL (ref 1.8–7.7)
NEUTROPHILS NFR BLD: 48 %
OSMOLALITY UR CALC.SUM OF ELEC: 282 MOSM/KG (ref 275–295)
PATIENT FASTING: NO
PLATELET # BLD AUTO: 338 K/UL (ref 140–400)
PMV BLD AUTO: 7.8 FL (ref 7.4–10.3)
POTASSIUM SERPL-SCNC: 3.3 MMOL/L (ref 3.3–5.1)
PROT SERPL-MCNC: 6.7 G/DL (ref 5.9–8.4)
RBC # BLD AUTO: 4.48 M/UL (ref 3.7–5.4)
SODIUM SERPL-SCNC: 138 MMOL/L (ref 136–144)
WBC # BLD AUTO: 7.4 K/UL (ref 4–11)

## 2018-03-09 PROCEDURE — 80053 COMPREHEN METABOLIC PANEL: CPT

## 2018-03-09 PROCEDURE — 99213 OFFICE O/P EST LOW 20 MIN: CPT | Performed by: FAMILY MEDICINE

## 2018-03-09 PROCEDURE — 82150 ASSAY OF AMYLASE: CPT

## 2018-03-09 PROCEDURE — 85025 COMPLETE CBC W/AUTO DIFF WBC: CPT

## 2018-03-09 PROCEDURE — 36415 COLL VENOUS BLD VENIPUNCTURE: CPT

## 2018-03-09 PROCEDURE — 99212 OFFICE O/P EST SF 10 MIN: CPT | Performed by: FAMILY MEDICINE

## 2018-03-09 PROCEDURE — 83690 ASSAY OF LIPASE: CPT

## 2018-03-09 RX ORDER — ONDANSETRON 8 MG/1
8 TABLET, ORALLY DISINTEGRATING ORAL EVERY 8 HOURS PRN
Qty: 20 TABLET | Refills: 0 | Status: ON HOLD | OUTPATIENT
Start: 2018-03-09 | End: 2019-12-28

## 2018-03-09 RX ORDER — RANITIDINE 150 MG/1
150 TABLET ORAL 2 TIMES DAILY
Qty: 50 TABLET | Refills: 0 | Status: ON HOLD | OUTPATIENT
Start: 2018-03-09 | End: 2019-12-28

## 2018-03-12 ENCOUNTER — NURSE TRIAGE (OUTPATIENT)
Dept: OTHER | Age: 43
End: 2018-03-12

## 2018-03-12 ENCOUNTER — TELEPHONE (OUTPATIENT)
Dept: OTHER | Age: 43
End: 2018-03-12

## 2018-03-12 NOTE — PROGRESS NOTES
HPI:    Patient ID: Imtiaz Davis is a 36year old female. Has new onset of anorexia and nausea with some vomiting. Started 3 weeks ago used some zofran and its helpful. Never had this before.          Review of Systems   Constitutional: Positive for a gi.   Also to go to er if any worsening.    F/u next week or earlier prn    Orders Placed This Encounter      CBC W Differential W Platelet [E]      Comp Metabolic Panel (14) [E]      Amylase [E]      Lipase [E]    Meds This Visit:  Signed Prescriptions Dis

## 2018-03-12 NOTE — TELEPHONE ENCOUNTER
Action Requested: Summary for Provider     []  Critical Lab, Recommendations Needed  [x] Need Additional Advice  []   FYI    []   Need Orders  [] Need Medications Sent to Pharmacy  []  Other     SUMMARY:  Pt continues with nausea, decrease appetite.  Taking

## 2018-03-13 ENCOUNTER — HOSPITAL ENCOUNTER (OUTPATIENT)
Dept: GENERAL RADIOLOGY | Age: 43
Discharge: HOME | End: 2018-03-13
Attending: INTERNAL MEDICINE
Payer: COMMERCIAL

## 2018-03-13 ENCOUNTER — TELEPHONE (OUTPATIENT)
Dept: GASTROENTEROLOGY | Facility: CLINIC | Age: 43
End: 2018-03-13

## 2018-03-13 DIAGNOSIS — R10.9 ABDOMINAL PAIN, UNSPECIFIED ABDOMINAL LOCATION: Primary | ICD-10-CM

## 2018-03-13 DIAGNOSIS — R10.9 ABDOMINAL PAIN, UNSPECIFIED ABDOMINAL LOCATION: ICD-10-CM

## 2018-03-13 PROCEDURE — 74018 RADEX ABDOMEN 1 VIEW: CPT | Performed by: INTERNAL MEDICINE

## 2018-03-13 RX ORDER — PANTOPRAZOLE SODIUM 40 MG/1
40 TABLET, DELAYED RELEASE ORAL
Qty: 60 TABLET | Refills: 0 | Status: SHIPPED | OUTPATIENT
Start: 2018-03-13 | End: 2018-05-12

## 2018-03-13 NOTE — TELEPHONE ENCOUNTER
I'm not sure about the dizziness, fatigue, weakness as the blood tests done by Dr. Hermann Rios yesterday looked fine as did her vital signs (blood pressure and heart rate) and so I would ask her about these symptoms    In regards to her nausea and abdominal [place]

## 2018-03-13 NOTE — TELEPHONE ENCOUNTER
Pt contacted and reviewed Dr. Rashmi Samuel message below with her in detail, she is aware she is to call central scheduling at [phone] to schedule Xray abdomen appt.  She is aware she is to d/c zantac and begin the pantoprazole and to f/u with her PCP regarding

## 2018-03-13 NOTE — TELEPHONE ENCOUNTER
Spoke with patient (identified name and ) ,results reviewed and verbalized understanding      Note      Its all normal

## 2018-03-13 NOTE — TELEPHONE ENCOUNTER
Pt states x 3wks, she has been experiencing intermittent nausea, worse in the morning for about 2 hours, and occurs again 2-3 times/day. Has intermittent dull abdominal pain above the belly button, rated 8/10 \"when bad\" and then normally 4-5/10.  States t

## 2018-03-13 NOTE — TELEPHONE ENCOUNTER
Spoke with patient and made her aware of Willian Mitchell message. Patient reports she is taking the Zofran every 8 hours PRN and it is not helping. Patient was instructed to keep her appointment for tomorrow and if symptoms get worse to please go to the ER.

## 2018-03-13 NOTE — TELEPHONE ENCOUNTER
Offered pt 1st available pt questioned whether to go to ER.  Pt is experiencing nausea and cant keep food down, upper abdominal pain please call thank you

## 2018-03-14 NOTE — TELEPHONE ENCOUNTER
Patient cancelled her OV yesterday, called patient (verified name and ), states that Dr Hernesto Connor gave her stronger rx and will FU with him, feeling better.

## 2018-03-15 NOTE — TELEPHONE ENCOUNTER
Noted, thank you Deon Collado.   Date Time Provider [facility]   4/3/2018 10:30 AM Terrance Vanegas DO [address]   4/12/2018 5:30 PM Toni Burton MD [facility]

## 2018-03-15 NOTE — TELEPHONE ENCOUNTER
FYI - Pt retd call. She states that medication is helping with pain so she declined 3/20/18 appt and scheduled for 4/12/18 at 5:30 at [facility] office.

## 2018-03-15 NOTE — TELEPHONE ENCOUNTER
Discussed with Estrada Ramírez to add pt to Dr. Mickey Wall schedule on 3/20/18 @ 9AM at the [facility].    CSS/TAN- please confirm appt with Dr. Mickey Wall on 3/20/18 @ 9AM at the [facility], arrival time of 8:45AM and re-route to RN's, thank you.

## 2018-03-24 ENCOUNTER — APPOINTMENT (OUTPATIENT)
Dept: GENERAL RADIOLOGY | Facility: HOSPITAL | Age: 43
End: 2018-03-24
Attending: EMERGENCY MEDICINE
Payer: COMMERCIAL

## 2018-03-24 ENCOUNTER — HOSPITAL ENCOUNTER (EMERGENCY)
Facility: HOSPITAL | Age: 43
Discharge: HOME | End: 2018-03-24
Attending: EMERGENCY MEDICINE
Payer: COMMERCIAL

## 2018-03-24 VITALS
DIASTOLIC BLOOD PRESSURE: 75 MMHG | WEIGHT: 130 LBS | HEART RATE: 69 BPM | OXYGEN SATURATION: 100 % | BODY MASS INDEX: 20.4 KG/M2 | RESPIRATION RATE: 16 BRPM | TEMPERATURE: 98 F | HEIGHT: 67 IN | SYSTOLIC BLOOD PRESSURE: 122 MMHG

## 2018-03-24 DIAGNOSIS — N30.00 ACUTE CYSTITIS WITHOUT HEMATURIA: Primary | ICD-10-CM

## 2018-03-24 DIAGNOSIS — S22.22XA FRACTURE OF BODY OF STERNUM, INITIAL ENCOUNTER FOR CLOSED FRACTURE: ICD-10-CM

## 2018-03-24 LAB
B-HCG UR QL: NEGATIVE
BILIRUB UR QL: NEGATIVE
COLOR UR: YELLOW
GLUCOSE UR-MCNC: NEGATIVE MG/DL
KETONES UR-MCNC: NEGATIVE MG/DL
NITRITE UR QL STRIP.AUTO: NEGATIVE
PH UR: 5 [PH] (ref 5–8)
PROT UR-MCNC: 30 MG/DL
RBC #/AREA URNS AUTO: 21 /HPF
SP GR UR STRIP: 1.02 (ref 1–1.03)
UROBILINOGEN UR STRIP-ACNC: <2
VIT C UR-MCNC: NEGATIVE MG/DL
WBC #/AREA URNS AUTO: 274 /HPF

## 2018-03-24 PROCEDURE — 81001 URINALYSIS AUTO W/SCOPE: CPT | Performed by: EMERGENCY MEDICINE

## 2018-03-24 PROCEDURE — 87088 URINE BACTERIA CULTURE: CPT | Performed by: EMERGENCY MEDICINE

## 2018-03-24 PROCEDURE — 99283 EMERGENCY DEPT VISIT LOW MDM: CPT

## 2018-03-24 PROCEDURE — 87186 SC STD MICRODIL/AGAR DIL: CPT | Performed by: EMERGENCY MEDICINE

## 2018-03-24 PROCEDURE — 81025 URINE PREGNANCY TEST: CPT

## 2018-03-24 PROCEDURE — 71120 X-RAY EXAM BREASTBONE 2/>VWS: CPT | Performed by: EMERGENCY MEDICINE

## 2018-03-24 PROCEDURE — 87086 URINE CULTURE/COLONY COUNT: CPT | Performed by: EMERGENCY MEDICINE

## 2018-03-24 RX ORDER — NITROFURANTOIN 25; 75 MG/1; MG/1
100 CAPSULE ORAL 2 TIMES DAILY
Qty: 14 CAPSULE | Refills: 0 | Status: SHIPPED | OUTPATIENT
Start: 2018-03-24 | End: 2018-03-31

## 2018-03-24 RX ORDER — HYDROCODONE BITARTRATE AND ACETAMINOPHEN 5; 325 MG/1; MG/1
1-2 TABLET ORAL EVERY 4 HOURS PRN
Qty: 16 TABLET | Refills: 0 | Status: SHIPPED | OUTPATIENT
Start: 2018-03-24 | End: 2018-03-31

## 2018-03-24 RX ORDER — HYDROCODONE BITARTRATE AND ACETAMINOPHEN 5; 325 MG/1; MG/1
1 TABLET ORAL ONCE
Status: COMPLETED | OUTPATIENT
Start: 2018-03-24 | End: 2018-03-24

## 2018-03-24 RX ORDER — HYDROCODONE BITARTRATE AND ACETAMINOPHEN 5; 325 MG/1; MG/1
1-2 TABLET ORAL EVERY 4 HOURS PRN
Qty: 10 TABLET | Refills: 0 | Status: SHIPPED | OUTPATIENT
Start: 2018-03-24 | End: 2018-03-24

## 2018-03-24 NOTE — ED INITIAL ASSESSMENT (HPI)
Pt c/I sternal pain, was doing push ups last week and felt like she strained the area.  Felt a pop to her sternum yesterday

## 2018-03-24 NOTE — ED NOTES
Patient states she was doing push ups on the back of her couch and fell to the ground and hurt her chest. A few days later, she was doing planks, and felt a pop in her sternums. States she is barely able to move.  Would also like to be checked for a UTI, an

## 2018-03-24 NOTE — ED PROVIDER NOTES
Patient Seen in: [facility] AND [facility] Emergency Department    History   Patient presents with:  Pain    Stated Complaint: injured sternum     HPI    58-year-old female presents for evaluation of chest pain.   Patient reports 6 days ago she was doing push-ups reviewed. All other systems reviewed and negative except as noted above.     Physical Exam   ED Triage Vitals [03/24/18 0905]  BP: 127/68  Pulse: 86  Resp: 16  Temp: 97.8 °F (36.6 °C)  Temp src: Oral  SpO2: 100 %  O2 Device: None (Room air)    Current: REFLEX - Abnormal; Notable for the following:        Result Value    Clarity Urine Hazy (*)     Protein Urine 30  (*)     Blood Urine Moderate (*)     Leukocyte Esterase Urine Large (*)     WBC Urine 274 (*)     RBC URINE 21 (*)     Bacteria Urine Many (*) 0    HYDROcodone-acetaminophen 5-325 MG Oral Tab  Take 1-2 tablets by mouth every 4 (four) hours as needed for Pain (Do not exceed 8 tabs per day. ).   Qty: 16 tablet Refills: 0

## 2018-03-26 ENCOUNTER — TELEPHONE (OUTPATIENT)
Dept: FAMILY MEDICINE CLINIC | Facility: CLINIC | Age: 43
End: 2018-03-26

## 2018-03-26 NOTE — TELEPHONE ENCOUNTER
Pt states she was in the ER over the weekend and was diagnosed with a bladder infection. Pt reviewed labs and noted she is infected with e. Coli, pt wants to make sure the macrobid that was prescribed is the best antibiotic for her.  Pt states she gets freq

## 2018-03-26 NOTE — TELEPHONE ENCOUNTER
Yes Pamila Plants will work against the bacteria causing her UTI. Sensitivity report shows no resistance to med.

## 2018-05-29 NOTE — TELEPHONE ENCOUNTER
Per RAEANN please cancel thyroid lab as pt had lab done on 5/2017. Per Micaela Osborne will cancel thyroid lab. Per RAEANN please also have uhcg run from [facility] sample. And is placing order now. Wendy informed and states have sample and will test. Msg to [address] as FYI. Then patient is leaving for a cruise and would like a prescription called in for the patch for motion sickness.  Please call and advise.

## 2018-10-29 ENCOUNTER — TELEPHONE (OUTPATIENT)
Dept: OTHER | Age: 43
End: 2018-10-29

## 2018-10-29 DIAGNOSIS — R92.8 ABNORMAL MAMMOGRAM: Primary | ICD-10-CM

## 2018-10-29 NOTE — TELEPHONE ENCOUNTER
Pt asking for mammogram order. Please advise as this would be a f/u from an abnormal mammo from 02/2018.

## 2018-10-29 NOTE — TELEPHONE ENCOUNTER
Patient aware that mammogram order was approved by Dr. Eliseo Unger. Patient given contact number for mammogram scheduling.

## 2018-12-26 ENCOUNTER — LAB ENCOUNTER (OUTPATIENT)
Dept: LAB | Age: 43
End: 2018-12-26
Attending: FAMILY MEDICINE
Payer: COMMERCIAL

## 2018-12-26 DIAGNOSIS — N30.10 INTERSTITIAL CYSTITIS: ICD-10-CM

## 2019-01-02 ENCOUNTER — TELEPHONE (OUTPATIENT)
Dept: FAMILY MEDICINE CLINIC | Facility: CLINIC | Age: 44
End: 2019-01-02

## 2019-01-02 NOTE — TELEPHONE ENCOUNTER
----- Message from Denisse Barrios MD sent at 12/31/2018  5:17 PM CST -----  Results normal. Please call patient and send results.  Continue present management

## 2019-03-18 ENCOUNTER — TELEPHONE (OUTPATIENT)
Dept: GASTROENTEROLOGY | Facility: CLINIC | Age: 44
End: 2019-03-18

## 2019-03-18 NOTE — TELEPHONE ENCOUNTER
Pt states that she has been having pain w/ bowel movements and bleeding since last week and over the weekend she noticed something protruding near rectum. Sitz baths and creams are not helping. Please call.

## 2019-03-18 NOTE — TELEPHONE ENCOUNTER
Pt contacted and reviewed Dr. Albaro Morataya recommendations and message below, she accepted the following appt, [place] [facility] and told to arrive 15 mins earlier:  Future Appointments   Date Time Provider Mari Dos Santos   3/22/2019  1:30 PM Domenic Forman

## 2019-03-18 NOTE — TELEPHONE ENCOUNTER
I can see her Friday at 1:30 PM    I would continue what she has been doing and she can add a topical lido OTC to the area katelyn Moore MD  [facility] - Gastroenterology  3/18/2019  10:18 AM

## 2019-03-18 NOTE — TELEPHONE ENCOUNTER
Pt states she has pain and bright red blood w/ every bowel mvmt. Has done sitz baths and used OTC hemorrhoids cream w/o relief of sxs. States she feels \"something protruding from the rectum and it's very painful. \" Feels it may be a fissure/tear.  Pain

## 2019-03-22 ENCOUNTER — OFFICE VISIT (OUTPATIENT)
Dept: GASTROENTEROLOGY | Facility: CLINIC | Age: 44
End: 2019-03-22
Payer: COMMERCIAL

## 2019-03-22 ENCOUNTER — TELEPHONE (OUTPATIENT)
Dept: GASTROENTEROLOGY | Facility: CLINIC | Age: 44
End: 2019-03-22

## 2019-03-22 VITALS
HEIGHT: 67 IN | DIASTOLIC BLOOD PRESSURE: 74 MMHG | WEIGHT: 157 LBS | SYSTOLIC BLOOD PRESSURE: 108 MMHG | HEART RATE: 83 BPM | BODY MASS INDEX: 24.64 KG/M2

## 2019-03-22 DIAGNOSIS — K62.89 RECTAL PAIN: ICD-10-CM

## 2019-03-22 DIAGNOSIS — K62.5 RECTAL BLEEDING: Primary | ICD-10-CM

## 2019-03-22 PROCEDURE — 99212 OFFICE O/P EST SF 10 MIN: CPT | Performed by: INTERNAL MEDICINE

## 2019-03-22 PROCEDURE — 99214 OFFICE O/P EST MOD 30 MIN: CPT | Performed by: INTERNAL MEDICINE

## 2019-03-22 NOTE — PATIENT INSTRUCTIONS
1. Continue your miralax    2. Continue -sitz baths - which is soaking the anal/rectal area in warm water without soap, bubble bath, or any additives to the water    3. Continue the topical lidocaine    4.  I will try and get you to see Dr. Cyrus Salmeron or Dr. Zane Vazquez

## 2019-03-22 NOTE — TELEPHONE ENCOUNTER
Discussed case with Dr. Summer Yee and would like pt to be seen by Dr. Choco Orellana today or soonest available for possibly thrombosed hemorrhoid. Please advise, thank you.

## 2019-03-22 NOTE — PROGRESS NOTES
[facility], [facility] - Gastroenterology                                                                                                  Clinic Progress Note    Patient pr Mother    • Cancer Maternal Uncle 64        tongue cancer-cause of death; d.57, ex-smoker   • Other (Other) Brother         intellectual disability   • Ovarian Cancer Maternal Aunt 45        d.45; ovarian ca   • Cancer Maternal Uncle 46        d.47; lung c affect    Labs/Imaging:     Patient's labs and imaging were reviewed and discussed with patient today. 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     Dis

## 2019-03-25 ENCOUNTER — OFFICE VISIT (OUTPATIENT)
Dept: SURGERY | Facility: CLINIC | Age: 44
End: 2019-03-25
Payer: COMMERCIAL

## 2019-03-25 VITALS
BODY MASS INDEX: 24.64 KG/M2 | SYSTOLIC BLOOD PRESSURE: 121 MMHG | WEIGHT: 157 LBS | HEIGHT: 67 IN | DIASTOLIC BLOOD PRESSURE: 79 MMHG | HEART RATE: 91 BPM | TEMPERATURE: 98 F

## 2019-03-25 DIAGNOSIS — K64.5 INTERNAL THROMBOSED HEMORRHOIDS: Primary | ICD-10-CM

## 2019-03-25 PROCEDURE — 99214 OFFICE O/P EST MOD 30 MIN: CPT | Performed by: SURGERY

## 2019-03-25 PROCEDURE — 99212 OFFICE O/P EST SF 10 MIN: CPT | Performed by: SURGERY

## 2019-03-25 NOTE — PROGRESS NOTES
History and Physical      Florian Murrieta is a 39year old female. HPI   Patient presents with:  Hemorrhoids: Patient has had rectal pain for the past three weeks, thrombosed hemorrhoid for 2 weeks.   Has had bleeding during wiping after bowel movement f Highest education level: Not on file    Occupational History      Occupation: Coding, billing        Employer: [employer]    Tobacco Use      Smoking status: Current Every Day Smoker        Packs/day: 0.50        Types: Cigarettes soft and nt, sl tender soft thrombosed hem in R lateral quad - internal or external, sl tear, no sig bleeding, tender, exam limited, nl tone.        DIAGNOSTICS      ASSESSMENT/PLAN   Assessment   Internal thrombosed hemorrhoids  (primary encounter diagnosi

## 2019-07-23 ENCOUNTER — OFFICE VISIT (OUTPATIENT)
Dept: FAMILY MEDICINE CLINIC | Facility: CLINIC | Age: 44
End: 2019-07-23
Payer: COMMERCIAL

## 2019-07-23 VITALS
BODY MASS INDEX: 25.23 KG/M2 | RESPIRATION RATE: 18 BRPM | SYSTOLIC BLOOD PRESSURE: 127 MMHG | HEIGHT: 66 IN | HEART RATE: 80 BPM | TEMPERATURE: 99 F | WEIGHT: 157 LBS | DIASTOLIC BLOOD PRESSURE: 86 MMHG

## 2019-07-23 DIAGNOSIS — Z80.3 FAMILY HISTORY OF BREAST CANCER: ICD-10-CM

## 2019-07-23 DIAGNOSIS — Z12.31 SCREENING MAMMOGRAM FOR HIGH-RISK PATIENT: ICD-10-CM

## 2019-07-23 DIAGNOSIS — Z00.00 ROUTINE PHYSICAL EXAMINATION: Primary | ICD-10-CM

## 2019-07-23 PROCEDURE — 99396 PREV VISIT EST AGE 40-64: CPT | Performed by: FAMILY MEDICINE

## 2019-07-23 NOTE — PROGRESS NOTES
HPI:    Patient ID: Florian Murrieta is a 38year old female. Patient is here for routine physical exam. No acute issues. No significant chronic medical problems. Patient is requesting testing. Diet and exercise have been good.  Pt has started doing kick sofi Eyes: Pupils are equal, round, and reactive to light. Conjunctivae and EOM are normal.   Neck: Normal range of motion. Neck supple. Cardiovascular: Normal rate, regular rhythm, normal heart sounds and intact distal pulses.    Pulmonary/Chest: Effort nor

## 2019-07-28 ENCOUNTER — APPOINTMENT (OUTPATIENT)
Dept: LAB | Facility: HOSPITAL | Age: 44
End: 2019-07-28
Attending: FAMILY MEDICINE
Payer: COMMERCIAL

## 2019-07-28 DIAGNOSIS — Z00.00 ROUTINE PHYSICAL EXAMINATION: ICD-10-CM

## 2019-07-28 LAB
ALBUMIN SERPL-MCNC: 3.5 G/DL (ref 3.4–5)
ALBUMIN/GLOB SERPL: 1.1 {RATIO} (ref 1–2)
ALP LIVER SERPL-CCNC: 46 U/L (ref 37–98)
ALT SERPL-CCNC: 16 U/L (ref 13–56)
ANION GAP SERPL CALC-SCNC: 5 MMOL/L (ref 0–18)
AST SERPL-CCNC: 12 U/L (ref 15–37)
BILIRUB SERPL-MCNC: 0.4 MG/DL (ref 0.1–2)
BUN BLD-MCNC: 9 MG/DL (ref 7–18)
BUN/CREAT SERPL: 12.7 (ref 10–20)
CALCIUM BLD-MCNC: 8.2 MG/DL (ref 8.5–10.1)
CHLORIDE SERPL-SCNC: 109 MMOL/L (ref 98–112)
CHOLEST SMN-MCNC: 177 MG/DL (ref ?–200)
CO2 SERPL-SCNC: 29 MMOL/L (ref 21–32)
CREAT BLD-MCNC: 0.71 MG/DL (ref 0.55–1.02)
DEPRECATED RDW RBC AUTO: 44.8 FL (ref 35.1–46.3)
ERYTHROCYTE [DISTWIDTH] IN BLOOD BY AUTOMATED COUNT: 13 % (ref 11–15)
FSH SERPL-ACNC: 9.9 MIU/ML
GLOBULIN PLAS-MCNC: 3.2 G/DL (ref 2.8–4.4)
GLUCOSE BLD-MCNC: 85 MG/DL (ref 70–99)
HCT VFR BLD AUTO: 40.6 % (ref 35–48)
HDLC SERPL-MCNC: 94 MG/DL (ref 40–59)
HGB BLD-MCNC: 13.6 G/DL (ref 12–16)
LDLC SERPL CALC-MCNC: 70 MG/DL (ref ?–100)
LH SERPL-ACNC: 10.9 MIU/ML
M PROTEIN MFR SERPL ELPH: 6.7 G/DL (ref 6.4–8.2)
MCH RBC QN AUTO: 31.3 PG (ref 26–34)
MCHC RBC AUTO-ENTMCNC: 33.5 G/DL (ref 31–37)
MCV RBC AUTO: 93.5 FL (ref 80–100)
NONHDLC SERPL-MCNC: 83 MG/DL (ref ?–130)
OSMOLALITY SERPL CALC.SUM OF ELEC: 294 MOSM/KG (ref 275–295)
PATIENT FASTING: YES
PATIENT FASTING: YES
PLATELET # BLD AUTO: 368 10(3)UL (ref 150–450)
POTASSIUM SERPL-SCNC: 3.5 MMOL/L (ref 3.5–5.1)
RBC # BLD AUTO: 4.34 X10(6)UL (ref 3.8–5.3)
SODIUM SERPL-SCNC: 143 MMOL/L (ref 136–145)
TRIGL SERPL-MCNC: 63 MG/DL (ref 30–149)
TSI SER-ACNC: 1.62 MIU/ML (ref 0.36–3.74)
VLDLC SERPL CALC-MCNC: 13 MG/DL (ref 0–30)
WBC # BLD AUTO: 6.7 X10(3) UL (ref 4–11)

## 2019-07-28 PROCEDURE — 84443 ASSAY THYROID STIM HORMONE: CPT

## 2019-07-28 PROCEDURE — 36415 COLL VENOUS BLD VENIPUNCTURE: CPT

## 2019-07-28 PROCEDURE — 85027 COMPLETE CBC AUTOMATED: CPT

## 2019-07-28 PROCEDURE — 83001 ASSAY OF GONADOTROPIN (FSH): CPT

## 2019-07-28 PROCEDURE — 80053 COMPREHEN METABOLIC PANEL: CPT

## 2019-07-28 PROCEDURE — 83002 ASSAY OF GONADOTROPIN (LH): CPT

## 2019-07-28 PROCEDURE — 80061 LIPID PANEL: CPT

## 2019-08-07 ENCOUNTER — TELEPHONE (OUTPATIENT)
Dept: UROLOGY | Facility: HOSPITAL | Age: 44
End: 2019-08-07

## 2019-08-07 NOTE — TELEPHONE ENCOUNTER
Patient left a message for nurse, c/o vaginal bleeding, asking for an apt, attempted to call patient back, left message, suggested she call [facility] for vaginal bleeding, to call us back with any c/o urinary issues or other questions

## 2019-08-08 ENCOUNTER — OFFICE VISIT (OUTPATIENT)
Dept: OBGYN CLINIC | Facility: CLINIC | Age: 44
End: 2019-08-08
Payer: COMMERCIAL

## 2019-08-08 VITALS
HEART RATE: 69 BPM | SYSTOLIC BLOOD PRESSURE: 94 MMHG | BODY MASS INDEX: 26 KG/M2 | DIASTOLIC BLOOD PRESSURE: 63 MMHG | WEIGHT: 160 LBS

## 2019-08-08 DIAGNOSIS — N92.0 EXCESSIVE OR FREQUENT MENSTRUATION: ICD-10-CM

## 2019-08-08 DIAGNOSIS — N92.1 MENORRHAGIA WITH IRREGULAR CYCLE: Primary | ICD-10-CM

## 2019-08-08 PROCEDURE — 99214 OFFICE O/P EST MOD 30 MIN: CPT | Performed by: OBSTETRICS & GYNECOLOGY

## 2019-08-08 PROCEDURE — 58100 BIOPSY OF UTERUS LINING: CPT | Performed by: OBSTETRICS & GYNECOLOGY

## 2019-08-09 NOTE — PROCEDURES
Endometrial Biopsy     Pre-Procedure Care:   Consent was obtained. Procedure/risks were explained. Questions were answered. Correct patient was identified. Correct side and site were confirmed.       Birth control method(s) used:  tubal ligation    Christianne

## 2019-08-09 NOTE — PROGRESS NOTES
Gen Wray is a 38year old female [id number] Patient's last menstrual period was 06/24/2019 (approximate).  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 , Disp: 50 tablet, Rfl: 0    ALLERGIES:    Penicillins             HIVES  Bactrim Ds nodes    Assessment & Plan:    Rachelle Martinez was seen today for gyn exam and menstrual problem. Diagnoses and all orders for this visit:    Menorrhagia with irregular cycle  -     US PELVIS EV (TRNS ABD AND ENDOVAG) ([id number]);  Future  -     SURGICAL PATH

## 2019-08-11 ENCOUNTER — HOSPITAL ENCOUNTER (OUTPATIENT)
Dept: MAMMOGRAPHY | Facility: HOSPITAL | Age: 44
Discharge: HOME | End: 2019-08-11
Attending: FAMILY MEDICINE
Payer: COMMERCIAL

## 2019-08-11 DIAGNOSIS — Z12.31 SCREENING MAMMOGRAM FOR HIGH-RISK PATIENT: ICD-10-CM

## 2019-08-11 PROCEDURE — 77063 BREAST TOMOSYNTHESIS BI: CPT | Performed by: FAMILY MEDICINE

## 2019-08-11 PROCEDURE — 77067 SCR MAMMO BI INCL CAD: CPT | Performed by: FAMILY MEDICINE

## 2019-08-24 ENCOUNTER — HOSPITAL ENCOUNTER (OUTPATIENT)
Dept: ULTRASOUND IMAGING | Facility: HOSPITAL | Age: 44
Discharge: HOME | End: 2019-08-24
Attending: OBSTETRICS & GYNECOLOGY
Payer: COMMERCIAL

## 2019-08-24 DIAGNOSIS — N92.1 MENORRHAGIA WITH IRREGULAR CYCLE: ICD-10-CM

## 2019-08-24 PROCEDURE — 76830 TRANSVAGINAL US NON-OB: CPT | Performed by: OBSTETRICS & GYNECOLOGY

## 2019-08-24 PROCEDURE — 76856 US EXAM PELVIC COMPLETE: CPT | Performed by: OBSTETRICS & GYNECOLOGY

## 2019-08-28 ENCOUNTER — PATIENT MESSAGE (OUTPATIENT)
Dept: OBGYN CLINIC | Facility: CLINIC | Age: 44
End: 2019-08-28

## 2019-08-28 NOTE — TELEPHONE ENCOUNTER
Needs to come in for annual update / test results -- can discuss plan then -- this was plan from last visit.

## 2019-08-28 NOTE — TELEPHONE ENCOUNTER
From: Ceci Aguilera  To: Salena Linda MD  Sent: 8/28/2019 1:30 PM CDT  Subject: Other    I have a question about Ultrasound Scan Pelvis resulted on 8/24/19 at 12:20 PM.    Hi Dr. Titus Florez,    Should I make an appt.  So we can discuss if there are any options

## 2019-12-28 ENCOUNTER — HOSPITAL ENCOUNTER (OUTPATIENT)
Age: 44
Discharge: ANOTHER HOSPITAL | End: 2019-12-28
Attending: EMERGENCY MEDICINE
Payer: COMMERCIAL

## 2019-12-28 ENCOUNTER — APPOINTMENT (OUTPATIENT)
Dept: GENERAL RADIOLOGY | Facility: HOSPITAL | Age: 44
DRG: 201 | End: 2019-12-28
Payer: COMMERCIAL

## 2019-12-28 ENCOUNTER — APPOINTMENT (OUTPATIENT)
Dept: GENERAL RADIOLOGY | Facility: HOSPITAL | Age: 44
DRG: 201 | End: 2019-12-28
Attending: EMERGENCY MEDICINE
Payer: COMMERCIAL

## 2019-12-28 ENCOUNTER — HOSPITAL ENCOUNTER (INPATIENT)
Facility: HOSPITAL | Age: 44
LOS: 7 days | Discharge: HOME | DRG: 201 | End: 2020-01-04
Admitting: INTERNAL MEDICINE
Payer: COMMERCIAL

## 2019-12-28 VITALS
WEIGHT: 140 LBS | HEART RATE: 106 BPM | TEMPERATURE: 98 F | DIASTOLIC BLOOD PRESSURE: 77 MMHG | RESPIRATION RATE: 20 BRPM | OXYGEN SATURATION: 94 % | BODY MASS INDEX: 21.97 KG/M2 | HEIGHT: 67 IN | SYSTOLIC BLOOD PRESSURE: 119 MMHG

## 2019-12-28 DIAGNOSIS — R06.00 DYSPNEA, UNSPECIFIED TYPE: Primary | ICD-10-CM

## 2019-12-28 DIAGNOSIS — J93.83 SPONTANEOUS PNEUMOTHORAX: Primary | ICD-10-CM

## 2019-12-28 LAB
ANION GAP SERPL CALC-SCNC: 7 MMOL/L (ref 0–18)
BASOPHILS # BLD AUTO: 0.03 X10(3) UL (ref 0–0.2)
BASOPHILS NFR BLD AUTO: 0.3 %
BUN BLD-MCNC: 8 MG/DL (ref 7–18)
BUN/CREAT SERPL: 11 (ref 10–20)
CALCIUM BLD-MCNC: 9 MG/DL (ref 8.5–10.1)
CHLORIDE SERPL-SCNC: 110 MMOL/L (ref 98–112)
CO2 SERPL-SCNC: 25 MMOL/L (ref 21–32)
CREAT BLD-MCNC: 0.73 MG/DL (ref 0.55–1.02)
DEPRECATED RDW RBC AUTO: 41 FL (ref 35.1–46.3)
EOSINOPHIL # BLD AUTO: 0.09 X10(3) UL (ref 0–0.7)
EOSINOPHIL NFR BLD AUTO: 0.8 %
ERYTHROCYTE [DISTWIDTH] IN BLOOD BY AUTOMATED COUNT: 12.3 % (ref 11–15)
GLUCOSE BLD-MCNC: 92 MG/DL (ref 70–99)
HCT VFR BLD AUTO: 42.8 % (ref 35–48)
HGB BLD-MCNC: 14.9 G/DL (ref 12–16)
IMM GRANULOCYTES # BLD AUTO: 0.03 X10(3) UL (ref 0–1)
IMM GRANULOCYTES NFR BLD: 0.3 %
LYMPHOCYTES # BLD AUTO: 3.17 X10(3) UL (ref 1–4)
LYMPHOCYTES NFR BLD AUTO: 27 %
MCH RBC QN AUTO: 31.8 PG (ref 26–34)
MCHC RBC AUTO-ENTMCNC: 34.8 G/DL (ref 31–37)
MCV RBC AUTO: 91.3 FL (ref 80–100)
MONOCYTES # BLD AUTO: 0.83 X10(3) UL (ref 0.1–1)
MONOCYTES NFR BLD AUTO: 7.1 %
NEUTROPHILS # BLD AUTO: 7.6 X10 (3) UL (ref 1.5–7.7)
NEUTROPHILS # BLD AUTO: 7.6 X10(3) UL (ref 1.5–7.7)
NEUTROPHILS NFR BLD AUTO: 64.5 %
OSMOLALITY SERPL CALC.SUM OF ELEC: 292 MOSM/KG (ref 275–295)
PLATELET # BLD AUTO: 381 10(3)UL (ref 150–450)
POTASSIUM SERPL-SCNC: 3.8 MMOL/L (ref 3.5–5.1)
RBC # BLD AUTO: 4.69 X10(6)UL (ref 3.8–5.3)
SODIUM SERPL-SCNC: 142 MMOL/L (ref 136–145)
WBC # BLD AUTO: 11.8 X10(3) UL (ref 4–11)

## 2019-12-28 PROCEDURE — 99212 OFFICE O/P EST SF 10 MIN: CPT

## 2019-12-28 PROCEDURE — 0W9930Z DRAINAGE OF RIGHT PLEURAL CAVITY WITH DRAINAGE DEVICE, PERCUTANEOUS APPROACH: ICD-10-PCS | Performed by: EMERGENCY MEDICINE

## 2019-12-28 PROCEDURE — 99213 OFFICE O/P EST LOW 20 MIN: CPT

## 2019-12-28 PROCEDURE — 71045 X-RAY EXAM CHEST 1 VIEW: CPT | Performed by: EMERGENCY MEDICINE

## 2019-12-28 PROCEDURE — 99254 IP/OBS CNSLTJ NEW/EST MOD 60: CPT | Performed by: INTERNAL MEDICINE

## 2019-12-28 PROCEDURE — 71046 X-RAY EXAM CHEST 2 VIEWS: CPT

## 2019-12-28 RX ORDER — SODIUM CHLORIDE 9 MG/ML
INJECTION, SOLUTION INTRAVENOUS CONTINUOUS
Status: DISCONTINUED | OUTPATIENT
Start: 2019-12-28 | End: 2019-12-29

## 2019-12-28 RX ORDER — ONDANSETRON 2 MG/ML
4 INJECTION INTRAMUSCULAR; INTRAVENOUS EVERY 6 HOURS PRN
Status: DISCONTINUED | OUTPATIENT
Start: 2019-12-28 | End: 2020-01-04

## 2019-12-28 RX ORDER — HYDROMORPHONE HYDROCHLORIDE 1 MG/ML
0.3 INJECTION, SOLUTION INTRAMUSCULAR; INTRAVENOUS; SUBCUTANEOUS EVERY 4 HOURS PRN
Status: DISCONTINUED | OUTPATIENT
Start: 2019-12-28 | End: 2020-01-04

## 2019-12-28 RX ORDER — HYDROMORPHONE HYDROCHLORIDE 1 MG/ML
0.5 INJECTION, SOLUTION INTRAMUSCULAR; INTRAVENOUS; SUBCUTANEOUS EVERY 4 HOURS PRN
Status: DISCONTINUED | OUTPATIENT
Start: 2019-12-28 | End: 2020-01-04

## 2019-12-28 RX ORDER — LIDOCAINE HYDROCHLORIDE 10 MG/ML
INJECTION, SOLUTION EPIDURAL; INFILTRATION; INTRACAUDAL; PERINEURAL
Status: COMPLETED
Start: 2019-12-28 | End: 2019-12-28

## 2019-12-28 RX ORDER — NITROGLYCERIN 0.4 MG/1
0.4 TABLET SUBLINGUAL EVERY 5 MIN PRN
Status: DISCONTINUED | OUTPATIENT
Start: 2019-12-28 | End: 2020-01-04

## 2019-12-28 RX ORDER — KETAMINE HYDROCHLORIDE 50 MG/ML
100 INJECTION, SOLUTION, CONCENTRATE INTRAMUSCULAR; INTRAVENOUS ONCE
Status: COMPLETED | OUTPATIENT
Start: 2019-12-28 | End: 2019-12-28

## 2019-12-28 RX ORDER — SODIUM CHLORIDE 0.9 % (FLUSH) 0.9 %
3 SYRINGE (ML) INJECTION AS NEEDED
Status: DISCONTINUED | OUTPATIENT
Start: 2019-12-28 | End: 2020-01-04

## 2019-12-28 RX ORDER — HEPARIN SODIUM 5000 [USP'U]/ML
5000 INJECTION, SOLUTION INTRAVENOUS; SUBCUTANEOUS EVERY 12 HOURS SCHEDULED
Status: DISCONTINUED | OUTPATIENT
Start: 2019-12-29 | End: 2020-01-04

## 2019-12-28 NOTE — ED INITIAL ASSESSMENT (HPI)
C/o cough and congestion for 2 weeks. Persistent cough, OTC meds without relief. Chest tightness and soreness from coughing. C/o SOB. Difficulty taking a deep breath. No fever but c/o chills and sweats.

## 2019-12-28 NOTE — ED PROVIDER NOTES
Patient Seen in: [facility]      History   Patient presents with:  Cough/URI    Stated Complaint: cough     HPI    26-year-old female presents for evaluation of shortness of breath.   Patient reports cough, chest pain, raj above.    Physical Exam     ED Triage Vitals [12/28/19 1546]   /77   Pulse 106   Resp 20   Temp 98.2 °F (36.8 °C)   Temp src Oral   SpO2 94 %   O2 Device None (Room air)       Current:/77   Pulse 106   Temp 98.2 °F (36.8 °C) (Oral)   Resp 20

## 2019-12-28 NOTE — ED INITIAL ASSESSMENT (HPI)
Cough x 3 days. Sent from [address] for r/o pe? Denies travel. Denies cp radiating anywhere. +smoker.  Denies [facility]

## 2019-12-29 ENCOUNTER — APPOINTMENT (OUTPATIENT)
Dept: GENERAL RADIOLOGY | Facility: HOSPITAL | Age: 44
DRG: 201 | End: 2019-12-29
Attending: HOSPITALIST
Payer: COMMERCIAL

## 2019-12-29 LAB
ANION GAP SERPL CALC-SCNC: 2 MMOL/L (ref 0–18)
BASOPHILS # BLD AUTO: 0.02 X10(3) UL (ref 0–0.2)
BASOPHILS NFR BLD AUTO: 0.2 %
BUN BLD-MCNC: 11 MG/DL (ref 7–18)
BUN/CREAT SERPL: 17.7 (ref 10–20)
CALCIUM BLD-MCNC: 8.3 MG/DL (ref 8.5–10.1)
CHLORIDE SERPL-SCNC: 107 MMOL/L (ref 98–112)
CO2 SERPL-SCNC: 28 MMOL/L (ref 21–32)
CREAT BLD-MCNC: 0.62 MG/DL (ref 0.55–1.02)
DEPRECATED RDW RBC AUTO: 41.2 FL (ref 35.1–46.3)
EOSINOPHIL # BLD AUTO: 0.13 X10(3) UL (ref 0–0.7)
EOSINOPHIL NFR BLD AUTO: 1.1 %
ERYTHROCYTE [DISTWIDTH] IN BLOOD BY AUTOMATED COUNT: 12.3 % (ref 11–15)
GLUCOSE BLD-MCNC: 95 MG/DL (ref 70–99)
HAV IGM SER QL: 2.1 MG/DL (ref 1.6–2.6)
HCT VFR BLD AUTO: 40.1 % (ref 35–48)
HGB BLD-MCNC: 13.5 G/DL (ref 12–16)
IMM GRANULOCYTES # BLD AUTO: 0.04 X10(3) UL (ref 0–1)
IMM GRANULOCYTES NFR BLD: 0.3 %
LYMPHOCYTES # BLD AUTO: 3.09 X10(3) UL (ref 1–4)
LYMPHOCYTES NFR BLD AUTO: 25.4 %
MCH RBC QN AUTO: 31 PG (ref 26–34)
MCHC RBC AUTO-ENTMCNC: 33.7 G/DL (ref 31–37)
MCV RBC AUTO: 92 FL (ref 80–100)
MONOCYTES # BLD AUTO: 0.79 X10(3) UL (ref 0.1–1)
MONOCYTES NFR BLD AUTO: 6.5 %
NEUTROPHILS # BLD AUTO: 8.11 X10 (3) UL (ref 1.5–7.7)
NEUTROPHILS # BLD AUTO: 8.11 X10(3) UL (ref 1.5–7.7)
NEUTROPHILS NFR BLD AUTO: 66.5 %
OSMOLALITY SERPL CALC.SUM OF ELEC: 283 MOSM/KG (ref 275–295)
PLATELET # BLD AUTO: 325 10(3)UL (ref 150–450)
POTASSIUM SERPL-SCNC: 3.7 MMOL/L (ref 3.5–5.1)
RBC # BLD AUTO: 4.36 X10(6)UL (ref 3.8–5.3)
SODIUM SERPL-SCNC: 137 MMOL/L (ref 136–145)
WBC # BLD AUTO: 12.2 X10(3) UL (ref 4–11)

## 2019-12-29 PROCEDURE — 99233 SBSQ HOSP IP/OBS HIGH 50: CPT | Performed by: INTERNAL MEDICINE

## 2019-12-29 PROCEDURE — 71045 X-RAY EXAM CHEST 1 VIEW: CPT | Performed by: HOSPITALIST

## 2019-12-29 PROCEDURE — 99233 SBSQ HOSP IP/OBS HIGH 50: CPT | Performed by: HOSPITALIST

## 2019-12-29 PROCEDURE — 99221 1ST HOSP IP/OBS SF/LOW 40: CPT | Performed by: INTERNAL MEDICINE

## 2019-12-29 RX ORDER — POTASSIUM CHLORIDE 20 MEQ/1
40 TABLET, EXTENDED RELEASE ORAL ONCE
Status: COMPLETED | OUTPATIENT
Start: 2019-12-29 | End: 2019-12-29

## 2019-12-29 RX ORDER — DOCUSATE SODIUM 100 MG/1
100 CAPSULE, LIQUID FILLED ORAL 2 TIMES DAILY
Status: DISCONTINUED | OUTPATIENT
Start: 2019-12-29 | End: 2020-01-04

## 2019-12-29 RX ORDER — DIPHENHYDRAMINE HYDROCHLORIDE 50 MG/ML
25 INJECTION INTRAMUSCULAR; INTRAVENOUS EVERY 4 HOURS PRN
Status: DISCONTINUED | OUTPATIENT
Start: 2019-12-29 | End: 2020-01-04

## 2019-12-29 RX ORDER — FAMOTIDINE 20 MG/1
20 TABLET ORAL 2 TIMES DAILY
Status: DISCONTINUED | OUTPATIENT
Start: 2019-12-29 | End: 2020-01-04

## 2019-12-29 RX ORDER — HYDROCODONE BITARTRATE AND ACETAMINOPHEN 5; 325 MG/1; MG/1
1 TABLET ORAL EVERY 4 HOURS PRN
Status: DISCONTINUED | OUTPATIENT
Start: 2019-12-29 | End: 2020-01-04

## 2019-12-29 RX ORDER — DIPHENHYDRAMINE HYDROCHLORIDE 50 MG/ML
50 INJECTION INTRAMUSCULAR; INTRAVENOUS ONCE
Status: COMPLETED | OUTPATIENT
Start: 2019-12-29 | End: 2019-12-29

## 2019-12-29 RX ORDER — HYDROXYZINE HYDROCHLORIDE 25 MG/1
25 TABLET, FILM COATED ORAL 3 TIMES DAILY PRN
Status: DISCONTINUED | OUTPATIENT
Start: 2019-12-29 | End: 2020-01-04

## 2019-12-29 NOTE — PROGRESS NOTES
[facility]    Progress Note    Davey Hargrove Patient Status:  Inpatient    1975 MRN [id number]   Location [facility] 3W/SW Attending Senait Delong MD   Hosp Day # 1 PCP Homero Busch MD       Subjective:     +hives wo bedside  D/w RN    Greater than 35 minutes spent, >50% spent counseling and coordinating of care as outlined above.       Results:     Lab Results   Component Value Date    WBC 12.2 (H) 12/29/2019    HGB 13.5 12/29/2019    HCT 40.1 12/29/2019    .0 1 Nataliia Franco MD on 12/28/2019 at 19:50          Ekg 12-lead    Result Date: 12/28/2019  ECG Report  Interpretation  --------------------------         Loan Vazquez MD  12/29/2019

## 2019-12-29 NOTE — ED PROVIDER NOTES
Patient Seen in: [facility] 3w/sw      History   Patient presents with:  Dyspnea BENJAMÍN SOB    Stated Complaint: sent from [facility] for r/o pe    HPI    42-year-old who is healthy but smokes coughing for 3 days without trauma and fever here for evaluation 1651 20   Temp 12/28/19 1651 98.2 °F (36.8 °C)   Temp src 12/28/19 2039 Oral   SpO2 12/28/19 1651 95 %   O2 Device 12/28/19 1750 None (Room air)       Current:/75   Pulse 76   Temp 98.3 °F (36.8 °C) (Oral)   Resp 20   Wt 63.5 kg   SpO2 95%   BMI 21. 9 METABOLIC PANEL (8)   CBC WITH DIFFERENTIAL WITH PLATELET   RAINBOW DRAW BLUE   RAINBOW DRAW LAVENDER   RAINBOW DRAW LIGHT GREEN   RAINBOW DRAW GOLD     EKG    Rate, intervals and axes as noted on EKG Report.   Rate: 90  Rhythm: Sinus Rhythm  Reading: Nonsp pulmonary vascularity is within normal limits. MEDIAST/VAMSI: No visible mass or adenopathy. LUNGS/PLEURA: There has been interval re-expansion of the right lung with minimal residual right apical pneumothorax.  Scattered reticular opacities at the right gilma my direct supervision the patient was given combination ketamine and propofol. An appropriate level of sedation was achieved. The patient remained hemodynamically stable with normal oxygen saturations during the procedure.   There were no complications re Unknown

## 2019-12-29 NOTE — PLAN OF CARE
Problem: Patient Centered Care  Goal: Patient preferences are identified and integrated in the patient's plan of care  Description  Interventions:  - What would you like us to know as we care for you?  Pt is a smoker  - Provide timely, complete, and accur prn Dilaudid for pain on the insertion site. Dr Jason Díaz notified of hives this AM. Call light within reach and calls appropriately. Nonskid socks in place.

## 2019-12-29 NOTE — H&P
[address] [facility] Patient Status:  Emergency    1975 MRN [id number]   Location [address] Attending Dong Medellin MD   Hosp Day # 0 PCP Berlin Garcia MD     Date:  15 Mother 46        cause of death; d.56   • Cancer Maternal Uncle 64        tongue cancer-cause of death; d.57, ex-smoker   • Other (Other) Brother         intellectual disability   • Ovarian Cancer Maternal Aunt 45        d.45; ovarian ca   • Cancer Materna moist.  Head:  Normocephalic, atraumatic. Neck:  Supple, non-tender, no carotid bruit, no jugular venous distention, no lymphadenopathy, no thyromegaly.   Respiratory: Absent breath breath sounds on the right side  Cardiovascular:  Normal rate, regular rhy collapse of the right lung.  At present, there is no evidence of mediastinal shift, but this could reflect developing tension pneumothorax.           Results of this examination were immediately called to the emergency department by Dr. Wallace Wilkinson at [phone] on 12/

## 2019-12-29 NOTE — PROGRESS NOTES
[facility]    Progress Note    Estrellita Myersyal Patient Status:  Inpatient    1975 MRN [id number]   Location [facility] 3W/SW Attending Irving Yun MD   Hosp Day # 1 PCP Uma Khan MD        Subjective:     Greg Dumas x-ray  Other order and management based on daily progress .     2–Smoker with possible underlying emphysema  Complete smoking cessation  Chest CT in a.m.     3- DVT prophylaxis   Heparin sq      D/w pt   D/w staff                       Results:     Lab Resu atelectasis.     Dictated by (CST): Nick Allred MD on 12/28/2019 at 19:49     Approved by (CST): Nick Allred MD on 12/28/2019 at 19:50          Ekg 12-lead    Result Date: 12/28/2019  ECG Report  Interpretation  --------------------------

## 2019-12-30 ENCOUNTER — APPOINTMENT (OUTPATIENT)
Dept: GENERAL RADIOLOGY | Facility: HOSPITAL | Age: 44
DRG: 201 | End: 2019-12-30
Attending: INTERNAL MEDICINE
Payer: COMMERCIAL

## 2019-12-30 ENCOUNTER — APPOINTMENT (OUTPATIENT)
Dept: CT IMAGING | Facility: HOSPITAL | Age: 44
DRG: 201 | End: 2019-12-30
Attending: INTERNAL MEDICINE
Payer: COMMERCIAL

## 2019-12-30 LAB — POTASSIUM SERPL-SCNC: 3.9 MMOL/L (ref 3.5–5.1)

## 2019-12-30 PROCEDURE — 99232 SBSQ HOSP IP/OBS MODERATE 35: CPT | Performed by: HOSPITALIST

## 2019-12-30 PROCEDURE — 71250 CT THORAX DX C-: CPT | Performed by: INTERNAL MEDICINE

## 2019-12-30 PROCEDURE — 99232 SBSQ HOSP IP/OBS MODERATE 35: CPT | Performed by: INTERNAL MEDICINE

## 2019-12-30 PROCEDURE — 71045 X-RAY EXAM CHEST 1 VIEW: CPT | Performed by: INTERNAL MEDICINE

## 2019-12-30 NOTE — PLAN OF CARE
Problem: Patient Centered Care  Goal: Patient preferences are identified and integrated in the patient's plan of care  Description  Interventions:  - What would you like us to know as we care for you?  From home with children  - Provide timely, complete, Verbalizes/displays adequate comfort level or patient's stated pain goal  Description  INTERVENTIONS:  - Encourage pt to monitor pain and request assistance  - Assess pain using appropriate pain scale  - Administer analgesics based on type and severity of

## 2019-12-30 NOTE — PROGRESS NOTES
[facility]    Progress Note    Jay Castillo Patient Status:  Inpatient    1975 MRN [id number]   Location [facility] 3W/SW Attending Sigifredo Lr MD   Hosp Day # 2 PCP Alexandra Rodas MD        Subjective:     Key Dumont 12/29/2019    ALB 3.5 07/28/2019    ALKPHO 46 07/28/2019    BILT 0.4 07/28/2019    TP 6.7 07/28/2019    AST 12 (L) 07/28/2019    ALT 16 07/28/2019    INR 1.0 10/25/2016    TSH 1.620 07/28/2019    HARPREET 51 03/09/2018    LIP 20 (L) 03/09/2018    MG 2.1 12/29/2 Ekg 12-lead    Result Date: 12/28/2019  ECG Report  Interpretation  -------------------------- Sinus Rhythm -Nonspecific ST depression.  ABNORMAL No previous ECGs available Electronically signed on 12/29/2019 at 22:00 by Froy Ferreira

## 2019-12-30 NOTE — PLAN OF CARE
Problem: Patient Centered Care  Goal: Patient preferences are identified and integrated in the patient's plan of care  Description  Interventions:  - What would you like us to know as we care for you?  From home with children  - Provide timely, complete, pain; up with assistance

## 2019-12-30 NOTE — PROGRESS NOTES
[facility]    Progress Note    Jo-Ann Holguin Patient Status:  Inpatient    1975 MRN [id number]   Location [facility] 3W/SW Attending Nino Sánchez MD   Hosp Day # 2 PCP Berlin Garcia MD       Subjective:     +hives re 12/29/2019    .0 12/29/2019    CREATSERUM 0.62 12/29/2019    BUN 11 12/29/2019     12/29/2019    K 3.9 12/30/2019     12/29/2019    CO2 28.0 12/29/2019    GLU 95 12/29/2019    CA 8.3 (L) 12/29/2019    ALB 3.5 07/28/2019    ALKPHO 46 07/2

## 2019-12-31 ENCOUNTER — APPOINTMENT (OUTPATIENT)
Dept: GENERAL RADIOLOGY | Facility: HOSPITAL | Age: 44
DRG: 201 | End: 2019-12-31
Attending: INTERNAL MEDICINE
Payer: COMMERCIAL

## 2019-12-31 PROCEDURE — 99232 SBSQ HOSP IP/OBS MODERATE 35: CPT | Performed by: HOSPITALIST

## 2019-12-31 PROCEDURE — 71045 X-RAY EXAM CHEST 1 VIEW: CPT | Performed by: INTERNAL MEDICINE

## 2019-12-31 PROCEDURE — 99232 SBSQ HOSP IP/OBS MODERATE 35: CPT | Performed by: INTERNAL MEDICINE

## 2019-12-31 RX ORDER — HYDROCODONE POLISTIREX AND CHLORPHENIRAMINE POLISTIREX 10; 8 MG/5ML; MG/5ML
5 SUSPENSION, EXTENDED RELEASE ORAL 2 TIMES DAILY PRN
Status: DISCONTINUED | OUTPATIENT
Start: 2019-12-31 | End: 2020-01-04

## 2019-12-31 RX ORDER — NICOTINE 21 MG/24HR
1 PATCH, TRANSDERMAL 24 HOURS TRANSDERMAL DAILY
Status: DISCONTINUED | OUTPATIENT
Start: 2019-12-31 | End: 2020-01-04

## 2019-12-31 NOTE — PLAN OF CARE
Alert and oriented. VSS. Pain controlled with Norco. Chest tube in place. Connected to intermittent suction.  Pt has blood around tubing near chest tube insertion site that pulmonology is aware of. RN reinforced tape and it seems to be less blood on the out breathe, Incentive Spirometry  - Assess the need for suctioning and perform as needed  - Assess and instruct to report SOB or any respiratory difficulty  - Respiratory Therapy support as indicated  - Manage/alleviate anxiety  - Monitor for signs/symptoms o

## 2019-12-31 NOTE — PLAN OF CARE
Problem: Patient Centered Care  Goal: Patient preferences are identified and integrated in the patient's plan of care  Description  Interventions:  - What would you like us to know as we care for you?  From home with children  - Provide timely, complete, the need for suctioning and perform as needed  - Assess and instruct to report SOB or any respiratory difficulty  - Respiratory Therapy support as indicated  - Manage/alleviate anxiety  - Monitor for signs/symptoms of CO2 retention  12/30/2019 1926 by Nata Kim

## 2019-12-31 NOTE — PROGRESS NOTES
[facility] HOSP - [facility]    Progress Note    Clemetine Pedlar Patient Status:  Inpatient    1975 MRN [id number]   Location [facility] 3W/SW Attending Pedro Renner MD   Hosp Day # 3 PCP Bubba Roldan MD       Subjective:     No hives 12/29/2019    HGB 13.5 12/29/2019    HCT 40.1 12/29/2019    .0 12/29/2019    CREATSERUM 0.62 12/29/2019    BUN 11 12/29/2019     12/29/2019    K 3.9 12/30/2019     12/29/2019    CO2 28.0 12/29/2019    GLU 95 12/29/2019    CA 8.3 (L) 12/2 Lashawn Hernandez MD  12/31/2019

## 2019-12-31 NOTE — PROGRESS NOTES
[facility] HOSP - [facility]    Progress Note    Clemetine Pedlar Patient Status:  Inpatient    1975 MRN [id number]   Location [facility] 3W/SW Attending Pedro Renner MD   Hosp Day # 3 PCP Bubba Roldan MD        Subjective:   Debbie Alba ALKPHO 46 07/28/2019    BILT 0.4 07/28/2019    TP 6.7 07/28/2019    AST 12 (L) 07/28/2019    ALT 16 07/28/2019    INR 1.0 10/25/2016    TSH 1.620 07/28/2019    HARPREET 51 03/09/2018    LIP 20 (L) 03/09/2018    MG 2.1 12/29/2019       Ct Chest (cpt=71250)    Re

## 2020-01-01 ENCOUNTER — APPOINTMENT (OUTPATIENT)
Dept: GENERAL RADIOLOGY | Facility: HOSPITAL | Age: 45
DRG: 201 | End: 2020-01-01
Attending: INTERNAL MEDICINE
Payer: COMMERCIAL

## 2020-01-01 PROCEDURE — 71045 X-RAY EXAM CHEST 1 VIEW: CPT | Performed by: INTERNAL MEDICINE

## 2020-01-01 PROCEDURE — 99233 SBSQ HOSP IP/OBS HIGH 50: CPT | Performed by: INTERNAL MEDICINE

## 2020-01-01 PROCEDURE — 99232 SBSQ HOSP IP/OBS MODERATE 35: CPT | Performed by: HOSPITALIST

## 2020-01-01 RX ORDER — SODIUM CHLORIDE 9 MG/ML
INJECTION, SOLUTION INTRAVENOUS CONTINUOUS
Status: DISCONTINUED | OUTPATIENT
Start: 2020-01-01 | End: 2020-01-02

## 2020-01-01 NOTE — PROGRESS NOTES
[facility]    Progress Note    Jo-Ann Holguin Patient Status:  Inpatient    1975 MRN [id number]   Location [facility] 3W/SW Attending Nino Sánchez MD   Hosp Day # 4 PCP Berlin Garcia MD        Subjective:     William Miranda staff                         Results:     Lab Results   Component Value Date    WBC 12.2 (H) 12/29/2019    HGB 13.5 12/29/2019    HCT 40.1 12/29/2019    .0 12/29/2019    CREATSERUM 0.62 12/29/2019    BUN 11 12/29/2019     12/29/2019    K 3.9 Portable  (cpt=71045)    Result Date: 12/31/2019  CONCLUSION:  1. Minimal residual right apical pneumothorax.     Dictated by (CST): Adelita Yang MD on 12/31/2019 at 7:21     Approved by (CST): Ngozi Silva MD on 12/31/2019 at 7:23

## 2020-01-01 NOTE — PROGRESS NOTES
[facility]    Progress Note    Jay Castillo Patient Status:  Inpatient    1975 MRN [id number]   Location [facility] 3W/SW Attending Sigifredo Lr MD   Hosp Day # 4 PCP Alexandra Rodas MD       Subjective:     More coug and famotidine     CODE STATUS  Full     Primary care physician  Sushil Nicole MD     Disposition: pending    D/w pt    D/w RN           Results:     Lab Results   Component Value Date    WBC 12.2 (H) 12/29/2019    HGB 13.5 12/29/2019    HCT 40.1 12/29/20 Chest Ap Portable  (cpt=71045)    Result Date: 1/1/2020  CONCLUSION:  1. Right-sided chest tube has partially retracted but again projects over the mid right lung.   While residual subcutaneous emphysema in the right lateral chest wall has slightly improved

## 2020-01-02 ENCOUNTER — APPOINTMENT (OUTPATIENT)
Dept: GENERAL RADIOLOGY | Facility: HOSPITAL | Age: 45
DRG: 201 | End: 2020-01-02
Attending: INTERNAL MEDICINE
Payer: COMMERCIAL

## 2020-01-02 PROCEDURE — 71045 X-RAY EXAM CHEST 1 VIEW: CPT | Performed by: INTERNAL MEDICINE

## 2020-01-02 PROCEDURE — 99233 SBSQ HOSP IP/OBS HIGH 50: CPT | Performed by: HOSPITALIST

## 2020-01-02 PROCEDURE — 99233 SBSQ HOSP IP/OBS HIGH 50: CPT | Performed by: INTERNAL MEDICINE

## 2020-01-02 RX ORDER — KETOROLAC TROMETHAMINE 15 MG/ML
15 INJECTION, SOLUTION INTRAMUSCULAR; INTRAVENOUS EVERY 6 HOURS PRN
Status: DISPENSED | OUTPATIENT
Start: 2020-01-02 | End: 2020-01-04

## 2020-01-02 RX ORDER — KETOROLAC TROMETHAMINE 30 MG/ML
30 INJECTION, SOLUTION INTRAMUSCULAR; INTRAVENOUS EVERY 6 HOURS PRN
Status: DISPENSED | OUTPATIENT
Start: 2020-01-02 | End: 2020-01-04

## 2020-01-02 NOTE — PAYOR COMM NOTE
--------------  CONTINUED STAY REVIEW    Payor: [facility] PPO  Subscriber #:  [id number]  Authorization Number: [id number]    Admit date: 12/28/19  Admit time: 2021    Admitting Physician: Evangelina Arteaga MD  Attending Physician:  Dara Malagon MD  Primary HYDROmorphone HCl (DILAUDID) 1 MG/ML injection 0.3 mg, 0.3 mg, Intravenous, Q4H PRN  •  HYDROmorphone HCl (DILAUDID) 1 MG/ML injection 0.5 mg, 0.5 mg, Intravenous, Q4H PRN        Allergies:     Penicillins             HIVES  Bactrim Ds [Sulfame*    HIVES, regular rate and rhythm  Abdominal: soft, non-tender; bowel sounds normal; no masses,  no organomegaly  Extremities: no cyanosis or edema  Pulses: palpable and symmetric  Skin: no rash today  Neurologic: Alert and oriented X 3, non focal  Psychiatric: coop with residual small right pneumothorax/hydropneumothorax. 2. Small right larger than left pleural effusions. 3. Mild centrilobular and paraseptal emphysematous disease with biapical blebs.   4. Scattered reticular opacities are present, most confluent at Husam Zamora MD on 12/31/2019 at [address] [place] [address], MD  1/1/2020            Electronically signed by Pablo Collins MD at 1/1/2020  1:15 PM          MEDICATIONS ADMINISTERED IN LAST 1 DAY:  docusate sodium (COLACE) cap 100 mg Device O2 Flow Rate (L/min) [facility]   01/02/20 0900 98.3 °F (36.8 °C) 73 12 — 94 % — None (Room air) — JR   01/02/20 0557 — — — — — 164 lb 1.6 oz — — KT   01/02/20 0554 98.2 °F (36.8 °C) 79 12 — 93 % — None (Room air) — NS   01/01/20 2242 — 90 — — 96 % — None

## 2020-01-02 NOTE — PROGRESS NOTES
Pulmonary/Critical Care Follow Up Note    HPI:   Fabricio Rene is a 38year old female with Patient presents with:  Dyspnea BENJAMÍN SOB      PCP Jacky Montanez MD  Admission Attending Humble Aguilar 15 Day #5    CP- thinks worse  No sob  No fever ITCHING        PHYSICAL EXAM:   Blood pressure 105/56, pulse 73, temperature 98.3 °F (36.8 °C), temperature source Oral, resp. rate 12, weight 164 lb 1.6 oz (74.4 kg), SpO2 94 %, not currently breastfeeding.     Intake/Output Summary (Last 24 hours) at 1/2/

## 2020-01-02 NOTE — PLAN OF CARE
Alert and oriented. Pt having trouble with pain control after having chest tube repositioned. Chest tube in place. Rash/redness around insertion site of tube. Pain managed with Norco and Dilaudid. No SOB. No dyspnea present. Non-productive cough present.  Evander Harada suctioning and perform as needed  - Assess and instruct to report SOB or any respiratory difficulty  - Respiratory Therapy support as indicated  - Manage/alleviate anxiety  - Monitor for signs/symptoms of CO2 retention  Outcome: Progressing     Problem: PA

## 2020-01-02 NOTE — PROGRESS NOTES
[facility] - [facility]    Progress Note    Velma Flank Anali Patient Status:  Inpatient    1975 MRN [id number]   Location [facility] 3W/SW Attending Ion Sanchez MD   Hosp Day # 5 PCP Mattie Serna MD       Subjective:   Audie Aleman i Normal Saline Flush, Atropine Sulfate, nitroGLYCERIN, ondansetron HCl, HYDROmorphone HCl, HYDROmorphone HCl    Results:     Recent Labs   Lab 12/28/19  1706 12/29/19  0555   RBC 4.69 4.36   HGB 14.9 13.5   HCT 42.8 40.1   MCV 91.3 92.0   MCH 31.8 31.0   MC than 5% right apical pneumothorax has slightly increased in size. Correlate clinically with chest tube function. 2. Stable mild atelectasis at the right lung base.     Dictated by (CST): Angel Bentley MD on 1/01/2020 at 8:31     Approved by (CST): Brian

## 2020-01-03 ENCOUNTER — APPOINTMENT (OUTPATIENT)
Dept: GENERAL RADIOLOGY | Facility: HOSPITAL | Age: 45
DRG: 201 | End: 2020-01-03
Attending: HOSPITALIST
Payer: COMMERCIAL

## 2020-01-03 LAB
ANION GAP SERPL CALC-SCNC: 5 MMOL/L (ref 0–18)
BUN BLD-MCNC: 10 MG/DL (ref 7–18)
BUN/CREAT SERPL: 12.3 (ref 10–20)
CALCIUM BLD-MCNC: 8.8 MG/DL (ref 8.5–10.1)
CHLORIDE SERPL-SCNC: 103 MMOL/L (ref 98–112)
CO2 SERPL-SCNC: 29 MMOL/L (ref 21–32)
CREAT BLD-MCNC: 0.81 MG/DL (ref 0.55–1.02)
DEPRECATED RDW RBC AUTO: 42 FL (ref 35.1–46.3)
ERYTHROCYTE [DISTWIDTH] IN BLOOD BY AUTOMATED COUNT: 12.3 % (ref 11–15)
GLUCOSE BLD-MCNC: 126 MG/DL (ref 70–99)
HCT VFR BLD AUTO: 39.5 % (ref 35–48)
HGB BLD-MCNC: 13.4 G/DL (ref 12–16)
MCH RBC QN AUTO: 31.8 PG (ref 26–34)
MCHC RBC AUTO-ENTMCNC: 33.9 G/DL (ref 31–37)
MCV RBC AUTO: 93.6 FL (ref 80–100)
OSMOLALITY SERPL CALC.SUM OF ELEC: 285 MOSM/KG (ref 275–295)
PLATELET # BLD AUTO: 317 10(3)UL (ref 150–450)
POTASSIUM SERPL-SCNC: 3.9 MMOL/L (ref 3.5–5.1)
RBC # BLD AUTO: 4.22 X10(6)UL (ref 3.8–5.3)
SODIUM SERPL-SCNC: 137 MMOL/L (ref 136–145)
WBC # BLD AUTO: 5.7 X10(3) UL (ref 4–11)

## 2020-01-03 PROCEDURE — 99232 SBSQ HOSP IP/OBS MODERATE 35: CPT | Performed by: INTERNAL MEDICINE

## 2020-01-03 PROCEDURE — 71045 X-RAY EXAM CHEST 1 VIEW: CPT | Performed by: HOSPITALIST

## 2020-01-03 PROCEDURE — 99232 SBSQ HOSP IP/OBS MODERATE 35: CPT | Performed by: HOSPITALIST

## 2020-01-03 NOTE — PROGRESS NOTES
[facility]    Progress Note    Cathy Briones Patient Status:  Inpatient    1975 MRN [id number]   Location [facility] 4W/SW/SE Attending Ivanna Rodrigues MD   Hosp Day # 6 PCP Berlin Garcia MD        Subjective:     Constit  (H) 01/03/2020    CA 8.8 01/03/2020    ALB 3.5 07/28/2019    ALKPHO 46 07/28/2019    BILT 0.4 07/28/2019    TP 6.7 07/28/2019    AST 12 (L) 07/28/2019    ALT 16 07/28/2019    INR 1.0 10/25/2016    TSH 1.620 07/28/2019    HARPREET 51 03/09/2018    LIP 20

## 2020-01-03 NOTE — PROGRESS NOTES
[facility]    Progress Note    Rosa Briones Patient Status:  Inpatient    1975 MRN [id number]   Location [facility] 4W/SW/SE Attending Mo Alvarado MD   Hosp Day # 6 PCP Milly Recinos MD       Subjective:   Misty Pranav diphenhydrAMINE HCl, HYDROcodone-acetaminophen, influenza virus vaccine PF, Normal Saline Flush, Atropine Sulfate, nitroGLYCERIN, ondansetron HCl, HYDROmorphone HCl, HYDROmorphone HCl    Results:     Recent Labs   Lab 12/28/19  1706 12/29/19  0555 01/03/20 Pulmonary following.   - First episode. Better today   - Will place on water seal today. FU CXR in AM. If stable clamp and d/c chest tube tomorrow if stable.    - rpt CXR in AM. XR today shows no PTX.   - Pain control alternating dilaudid and norco. toradol

## 2020-01-04 ENCOUNTER — APPOINTMENT (OUTPATIENT)
Dept: GENERAL RADIOLOGY | Facility: HOSPITAL | Age: 45
DRG: 201 | End: 2020-01-04
Attending: INTERNAL MEDICINE
Payer: COMMERCIAL

## 2020-01-04 VITALS
TEMPERATURE: 98 F | HEART RATE: 83 BPM | OXYGEN SATURATION: 95 % | BODY MASS INDEX: 26 KG/M2 | RESPIRATION RATE: 18 BRPM | DIASTOLIC BLOOD PRESSURE: 82 MMHG | WEIGHT: 164.13 LBS | SYSTOLIC BLOOD PRESSURE: 102 MMHG

## 2020-01-04 PROCEDURE — 71045 X-RAY EXAM CHEST 1 VIEW: CPT | Performed by: INTERNAL MEDICINE

## 2020-01-04 PROCEDURE — 99232 SBSQ HOSP IP/OBS MODERATE 35: CPT | Performed by: INTERNAL MEDICINE

## 2020-01-04 PROCEDURE — 99239 HOSP IP/OBS DSCHRG MGMT >30: CPT | Performed by: HOSPITALIST

## 2020-01-04 RX ORDER — HYDROCODONE BITARTRATE AND ACETAMINOPHEN 5; 325 MG/1; MG/1
1 TABLET ORAL EVERY 6 HOURS PRN
Qty: 20 TABLET | Refills: 0 | Status: SHIPPED | OUTPATIENT
Start: 2020-01-04 | End: 2021-03-02

## 2020-01-04 RX ORDER — HYDROCODONE POLISTIREX AND CHLORPHENIRAMINE POLISTIREX 10; 8 MG/5ML; MG/5ML
5 SUSPENSION, EXTENDED RELEASE ORAL 2 TIMES DAILY PRN
Qty: 115 ML | Refills: 0 | Status: SHIPPED | OUTPATIENT
Start: 2020-01-04 | End: 2020-01-04

## 2020-01-04 RX ORDER — HYDROCODONE POLISTIREX AND CHLORPHENIRAMINE POLISTIREX 10; 8 MG/5ML; MG/5ML
5 SUSPENSION, EXTENDED RELEASE ORAL 2 TIMES DAILY PRN
Qty: 115 ML | Refills: 0 | Status: SHIPPED | OUTPATIENT
Start: 2020-01-04 | End: 2021-03-02

## 2020-01-04 RX ORDER — NICOTINE 21 MG/24HR
1 PATCH, TRANSDERMAL 24 HOURS TRANSDERMAL DAILY
Qty: 28 PATCH | Refills: 0 | Status: ON HOLD | OUTPATIENT
Start: 2020-01-05 | End: 2020-01-16

## 2020-01-04 NOTE — PROGRESS NOTES
[facility]     Progress Note        Maria De Jesus Cornea Anali Patient Status:  Inpatient    1975 MRN [id number]   Location [facility] 4W/SW/SE Attending Matthew Silva MD   Hosp Day # 7 PCP Marjorie Mahoney MD       Subjective:   Patient Infusions:     Physical Exam  Constitutional: no acute distress  Eyes: PERRL  ENT: nares pateint  Cardio: RRR, S1 S2  Respiratory: clear to auscultation bilaterally, no wheezing, rales, rhonchi, crackles  GI: abdomen soft, non tender  Extremities: no clubb

## 2020-01-04 NOTE — DISCHARGE SUMMARY
[facility] Hospitalist Discharge Summary   Patient ID:  Misty Rock  [id number]  10 year old  4/30/1975    Admit date: 12/28/2019  Discharge date: 1/4/2020  Primary Care Physician: Milly Recinos MD   Attending Physician: Mo Alvarado MD   Consults: Date: 1/3/2020  CONCLUSION:  * Trace right apical pneumothorax noted. * Right chest tube remains in place. * There is minimal right basilar consolidation/atelectasis showing improvement from January 2, 2020.  * There is some atelectasis at the left base whi days [] within 14 days [] other     Disposition: home  Discharged Condition: good    Hospital Discharge Diagnoses: PTX    Lace+ Score: 32  59-90 High Risk  29-58 Medium Risk  0-28   Low Risk. TCM Follow-Up Recommendation:  LACE > 58:  High Risk of readmi

## 2020-01-06 ENCOUNTER — PATIENT OUTREACH (OUTPATIENT)
Dept: CASE MANAGEMENT | Age: 45
End: 2020-01-06

## 2020-01-06 ENCOUNTER — TELEPHONE (OUTPATIENT)
Dept: PULMONOLOGY | Facility: CLINIC | Age: 45
End: 2020-01-06

## 2020-01-06 NOTE — TELEPHONE ENCOUNTER
Appt made for 1/10/20 4:15 pm.  CSS, please inform pt of date time and location of appt when pt calls back.

## 2020-01-06 NOTE — TELEPHONE ENCOUNTER
Pt calling for hospital follow up appt. Pt was under Dr. Rebecca denise at [facility] and per Dr. iCnthia Blanchard follow up appt is needed this week. Pt prefers to have appt this Friday at the [address] [place] [address] [place] office.  Please call [phone]

## 2020-01-07 NOTE — PROGRESS NOTES
NCM attempted to contact patient for hospital follow up. Unable to leave message as mailbox is full. NCM will try again later.

## 2020-01-10 ENCOUNTER — OFFICE VISIT (OUTPATIENT)
Dept: PULMONOLOGY | Facility: CLINIC | Age: 45
End: 2020-01-10
Payer: COMMERCIAL

## 2020-01-10 ENCOUNTER — HOSPITAL ENCOUNTER (INPATIENT)
Facility: HOSPITAL | Age: 45
LOS: 6 days | Discharge: HOME | DRG: 165 | End: 2020-01-16
Attending: EMERGENCY MEDICINE | Admitting: EMERGENCY MEDICINE
Payer: COMMERCIAL

## 2020-01-10 ENCOUNTER — APPOINTMENT (OUTPATIENT)
Dept: GENERAL RADIOLOGY | Facility: HOSPITAL | Age: 45
DRG: 165 | End: 2020-01-10
Attending: EMERGENCY MEDICINE
Payer: COMMERCIAL

## 2020-01-10 ENCOUNTER — HOSPITAL ENCOUNTER (OUTPATIENT)
Dept: GENERAL RADIOLOGY | Age: 45
Discharge: HOME | End: 2020-01-10
Attending: INTERNAL MEDICINE
Payer: COMMERCIAL

## 2020-01-10 VITALS
HEIGHT: 67 IN | OXYGEN SATURATION: 93 % | WEIGHT: 160 LBS | RESPIRATION RATE: 18 BRPM | HEART RATE: 105 BPM | SYSTOLIC BLOOD PRESSURE: 103 MMHG | BODY MASS INDEX: 25.11 KG/M2 | DIASTOLIC BLOOD PRESSURE: 69 MMHG

## 2020-01-10 DIAGNOSIS — J93.11 PRIMARY SPONTANEOUS PNEUMOTHORAX: Primary | ICD-10-CM

## 2020-01-10 DIAGNOSIS — J93.83 RECURRENT SPONTANEOUS PNEUMOTHORAX: ICD-10-CM

## 2020-01-10 DIAGNOSIS — J93.9 PNEUMOTHORAX, UNSPECIFIED TYPE: Primary | ICD-10-CM

## 2020-01-10 DIAGNOSIS — J93.11 PRIMARY SPONTANEOUS PNEUMOTHORAX: ICD-10-CM

## 2020-01-10 LAB
ANION GAP SERPL CALC-SCNC: 5 MMOL/L (ref 0–18)
BASOPHILS # BLD AUTO: 0.12 X10(3) UL (ref 0–0.2)
BASOPHILS NFR BLD AUTO: 1.1 %
BUN BLD-MCNC: 10 MG/DL (ref 7–18)
BUN/CREAT SERPL: 12.5 (ref 10–20)
CALCIUM BLD-MCNC: 9.3 MG/DL (ref 8.5–10.1)
CHLORIDE SERPL-SCNC: 106 MMOL/L (ref 98–112)
CO2 SERPL-SCNC: 29 MMOL/L (ref 21–32)
CREAT BLD-MCNC: 0.8 MG/DL (ref 0.55–1.02)
DEPRECATED RDW RBC AUTO: 40.4 FL (ref 35.1–46.3)
EOSINOPHIL # BLD AUTO: 0.39 X10(3) UL (ref 0–0.7)
EOSINOPHIL NFR BLD AUTO: 3.6 %
ERYTHROCYTE [DISTWIDTH] IN BLOOD BY AUTOMATED COUNT: 12.3 % (ref 11–15)
GLUCOSE BLD-MCNC: 86 MG/DL (ref 70–99)
HCT VFR BLD AUTO: 44.5 % (ref 35–48)
HGB BLD-MCNC: 15.4 G/DL (ref 12–16)
IMM GRANULOCYTES # BLD AUTO: 0.05 X10(3) UL (ref 0–1)
IMM GRANULOCYTES NFR BLD: 0.5 %
LYMPHOCYTES # BLD AUTO: 3.71 X10(3) UL (ref 1–4)
LYMPHOCYTES NFR BLD AUTO: 34.4 %
MCH RBC QN AUTO: 31.4 PG (ref 26–34)
MCHC RBC AUTO-ENTMCNC: 34.6 G/DL (ref 31–37)
MCV RBC AUTO: 90.6 FL (ref 80–100)
MONOCYTES # BLD AUTO: 0.98 X10(3) UL (ref 0.1–1)
MONOCYTES NFR BLD AUTO: 9.1 %
NEUTROPHILS # BLD AUTO: 5.55 X10 (3) UL (ref 1.5–7.7)
NEUTROPHILS # BLD AUTO: 5.55 X10(3) UL (ref 1.5–7.7)
NEUTROPHILS NFR BLD AUTO: 51.3 %
OSMOLALITY SERPL CALC.SUM OF ELEC: 288 MOSM/KG (ref 275–295)
PLATELET # BLD AUTO: 514 10(3)UL (ref 150–450)
POTASSIUM SERPL-SCNC: 3.9 MMOL/L (ref 3.5–5.1)
RBC # BLD AUTO: 4.91 X10(6)UL (ref 3.8–5.3)
SODIUM SERPL-SCNC: 140 MMOL/L (ref 136–145)
WBC # BLD AUTO: 10.8 X10(3) UL (ref 4–11)

## 2020-01-10 PROCEDURE — 0W9930Z DRAINAGE OF RIGHT PLEURAL CAVITY WITH DRAINAGE DEVICE, PERCUTANEOUS APPROACH: ICD-10-PCS | Performed by: INTERNAL MEDICINE

## 2020-01-10 PROCEDURE — 71046 X-RAY EXAM CHEST 2 VIEWS: CPT | Performed by: INTERNAL MEDICINE

## 2020-01-10 PROCEDURE — 99214 OFFICE O/P EST MOD 30 MIN: CPT | Performed by: INTERNAL MEDICINE

## 2020-01-10 PROCEDURE — 71045 X-RAY EXAM CHEST 1 VIEW: CPT | Performed by: EMERGENCY MEDICINE

## 2020-01-10 PROCEDURE — 99223 1ST HOSP IP/OBS HIGH 75: CPT | Performed by: HOSPITALIST

## 2020-01-10 PROCEDURE — 99222 1ST HOSP IP/OBS MODERATE 55: CPT | Performed by: INTERNAL MEDICINE

## 2020-01-10 PROCEDURE — 32551 INSERTION OF CHEST TUBE: CPT | Performed by: INTERNAL MEDICINE

## 2020-01-10 RX ORDER — ACETAMINOPHEN 325 MG/1
650 TABLET ORAL EVERY 4 HOURS PRN
Status: DISCONTINUED | OUTPATIENT
Start: 2020-01-10 | End: 2020-01-13

## 2020-01-10 RX ORDER — SODIUM CHLORIDE 0.9 % (FLUSH) 0.9 %
3 SYRINGE (ML) INJECTION AS NEEDED
Status: DISCONTINUED | OUTPATIENT
Start: 2020-01-10 | End: 2020-01-16

## 2020-01-10 RX ORDER — HYDROCODONE BITARTRATE AND ACETAMINOPHEN 5; 325 MG/1; MG/1
1 TABLET ORAL EVERY 4 HOURS PRN
Status: DISCONTINUED | OUTPATIENT
Start: 2020-01-10 | End: 2020-01-13

## 2020-01-10 RX ORDER — LIDOCAINE HYDROCHLORIDE AND EPINEPHRINE 20; 5 MG/ML; UG/ML
INJECTION, SOLUTION EPIDURAL; INFILTRATION; INTRACAUDAL; PERINEURAL
Status: COMPLETED
Start: 2020-01-10 | End: 2020-01-10

## 2020-01-10 RX ORDER — DOCUSATE SODIUM 100 MG/1
100 CAPSULE, LIQUID FILLED ORAL 2 TIMES DAILY
Status: DISCONTINUED | OUTPATIENT
Start: 2020-01-10 | End: 2020-01-16

## 2020-01-10 RX ORDER — MIDAZOLAM HYDROCHLORIDE 5 MG/ML
3 INJECTION INTRAMUSCULAR; INTRAVENOUS ONCE
Status: COMPLETED | OUTPATIENT
Start: 2020-01-10 | End: 2020-01-10

## 2020-01-10 RX ORDER — ONDANSETRON 2 MG/ML
4 INJECTION INTRAMUSCULAR; INTRAVENOUS EVERY 6 HOURS PRN
Status: DISCONTINUED | OUTPATIENT
Start: 2020-01-10 | End: 2020-01-16

## 2020-01-10 RX ORDER — MORPHINE SULFATE 2 MG/ML
2 INJECTION, SOLUTION INTRAMUSCULAR; INTRAVENOUS EVERY 2 HOUR PRN
Status: DISCONTINUED | OUTPATIENT
Start: 2020-01-10 | End: 2020-01-16

## 2020-01-10 RX ORDER — DIPHENHYDRAMINE HYDROCHLORIDE 50 MG/ML
25 INJECTION INTRAMUSCULAR; INTRAVENOUS ONCE
Status: COMPLETED | OUTPATIENT
Start: 2020-01-10 | End: 2020-01-10

## 2020-01-10 RX ORDER — NICOTINE 21 MG/24HR
1 PATCH, TRANSDERMAL 24 HOURS TRANSDERMAL DAILY
Status: DISCONTINUED | OUTPATIENT
Start: 2020-01-11 | End: 2020-01-10

## 2020-01-10 RX ORDER — NICOTINE 21 MG/24HR
1 PATCH, TRANSDERMAL 24 HOURS TRANSDERMAL DAILY
Status: DISCONTINUED | OUTPATIENT
Start: 2020-01-10 | End: 2020-01-16

## 2020-01-10 RX ORDER — SODIUM CHLORIDE 9 MG/ML
INJECTION, SOLUTION INTRAVENOUS CONTINUOUS
Status: DISCONTINUED | OUTPATIENT
Start: 2020-01-10 | End: 2020-01-11

## 2020-01-10 RX ORDER — MORPHINE SULFATE 4 MG/ML
6 INJECTION, SOLUTION INTRAMUSCULAR; INTRAVENOUS EVERY 2 HOUR PRN
Status: DISCONTINUED | OUTPATIENT
Start: 2020-01-10 | End: 2020-01-16

## 2020-01-10 RX ORDER — POLYETHYLENE GLYCOL 3350 17 G/17G
17 POWDER, FOR SOLUTION ORAL DAILY PRN
Status: DISCONTINUED | OUTPATIENT
Start: 2020-01-10 | End: 2020-01-16

## 2020-01-10 RX ORDER — PROCHLORPERAZINE EDISYLATE 5 MG/ML
5 INJECTION INTRAMUSCULAR; INTRAVENOUS EVERY 8 HOURS PRN
Status: DISCONTINUED | OUTPATIENT
Start: 2020-01-10 | End: 2020-01-16

## 2020-01-10 RX ORDER — LIDOCAINE HYDROCHLORIDE AND EPINEPHRINE 20; 5 MG/ML; UG/ML
20 INJECTION, SOLUTION EPIDURAL; INFILTRATION; INTRACAUDAL; PERINEURAL ONCE
Status: COMPLETED | OUTPATIENT
Start: 2020-01-10 | End: 2020-01-10

## 2020-01-10 RX ORDER — HEPARIN SODIUM 5000 [USP'U]/ML
5000 INJECTION, SOLUTION INTRAVENOUS; SUBCUTANEOUS EVERY 12 HOURS SCHEDULED
Status: DISCONTINUED | OUTPATIENT
Start: 2020-01-11 | End: 2020-01-16

## 2020-01-10 RX ORDER — HYDROCODONE POLISTIREX AND CHLORPHENIRAMINE POLISTIREX 10; 8 MG/5ML; MG/5ML
5 SUSPENSION, EXTENDED RELEASE ORAL 2 TIMES DAILY PRN
Status: DISCONTINUED | OUTPATIENT
Start: 2020-01-10 | End: 2020-01-16

## 2020-01-10 RX ORDER — DIPHENHYDRAMINE HYDROCHLORIDE 50 MG/ML
25 INJECTION INTRAMUSCULAR; INTRAVENOUS EVERY 4 HOURS PRN
Status: DISCONTINUED | OUTPATIENT
Start: 2020-01-10 | End: 2020-01-16

## 2020-01-10 RX ORDER — HYDROCODONE BITARTRATE AND ACETAMINOPHEN 5; 325 MG/1; MG/1
2 TABLET ORAL EVERY 4 HOURS PRN
Status: DISCONTINUED | OUTPATIENT
Start: 2020-01-10 | End: 2020-01-13

## 2020-01-10 RX ORDER — METOCLOPRAMIDE HYDROCHLORIDE 5 MG/ML
10 INJECTION INTRAMUSCULAR; INTRAVENOUS EVERY 8 HOURS PRN
Status: DISCONTINUED | OUTPATIENT
Start: 2020-01-10 | End: 2020-01-16

## 2020-01-10 RX ORDER — BISACODYL 10 MG
10 SUPPOSITORY, RECTAL RECTAL
Status: DISCONTINUED | OUTPATIENT
Start: 2020-01-10 | End: 2020-01-16

## 2020-01-10 RX ORDER — MORPHINE SULFATE 4 MG/ML
4 INJECTION, SOLUTION INTRAMUSCULAR; INTRAVENOUS EVERY 2 HOUR PRN
Status: DISCONTINUED | OUTPATIENT
Start: 2020-01-10 | End: 2020-01-16

## 2020-01-10 RX ORDER — MORPHINE SULFATE 4 MG/ML
4 INJECTION, SOLUTION INTRAMUSCULAR; INTRAVENOUS ONCE
Status: COMPLETED | OUTPATIENT
Start: 2020-01-10 | End: 2020-01-10

## 2020-01-10 NOTE — PROGRESS NOTES
HPI:    Patient ID: Jalen Richards is a 38year old female.     HPI  Presented for follow up in my office today   Discharged on sat 1/4/2020 and she had cough at that night   Now minimal cough but dyspnea on exertion , and comfortable at rest   No cp and no Neurological: She is oriented to person, place, and time. Skin: Skin is dry. Psychiatric: She has a normal mood and affect.  Her behavior is normal. Judgment and thought content normal.              ASSESSMENT/PLAN:   Primary spontaneous pneumothorax

## 2020-01-11 LAB
ANION GAP SERPL CALC-SCNC: 5 MMOL/L (ref 0–18)
ANTIBODY SCREEN: NEGATIVE
B-HCG UR QL: NEGATIVE
BASOPHILS # BLD AUTO: 0.06 X10(3) UL (ref 0–0.2)
BASOPHILS NFR BLD AUTO: 0.5 %
BUN BLD-MCNC: 10 MG/DL (ref 7–18)
BUN/CREAT SERPL: 13.9 (ref 10–20)
CALCIUM BLD-MCNC: 8.2 MG/DL (ref 8.5–10.1)
CHLORIDE SERPL-SCNC: 106 MMOL/L (ref 98–112)
CO2 SERPL-SCNC: 27 MMOL/L (ref 21–32)
CREAT BLD-MCNC: 0.72 MG/DL (ref 0.55–1.02)
DEPRECATED RDW RBC AUTO: 41.4 FL (ref 35.1–46.3)
EOSINOPHIL # BLD AUTO: 0.28 X10(3) UL (ref 0–0.7)
EOSINOPHIL NFR BLD AUTO: 2.4 %
ERYTHROCYTE [DISTWIDTH] IN BLOOD BY AUTOMATED COUNT: 12.5 % (ref 11–15)
GLUCOSE BLD-MCNC: 100 MG/DL (ref 70–99)
HCT VFR BLD AUTO: 39.1 % (ref 35–48)
HGB BLD-MCNC: 13.4 G/DL (ref 12–16)
IMM GRANULOCYTES # BLD AUTO: 0.04 X10(3) UL (ref 0–1)
IMM GRANULOCYTES NFR BLD: 0.3 %
LYMPHOCYTES # BLD AUTO: 2.72 X10(3) UL (ref 1–4)
LYMPHOCYTES NFR BLD AUTO: 23.7 %
MCH RBC QN AUTO: 31.2 PG (ref 26–34)
MCHC RBC AUTO-ENTMCNC: 34.3 G/DL (ref 31–37)
MCV RBC AUTO: 91.1 FL (ref 80–100)
MONOCYTES # BLD AUTO: 0.71 X10(3) UL (ref 0.1–1)
MONOCYTES NFR BLD AUTO: 6.2 %
NEUTROPHILS # BLD AUTO: 7.66 X10 (3) UL (ref 1.5–7.7)
NEUTROPHILS # BLD AUTO: 7.66 X10(3) UL (ref 1.5–7.7)
NEUTROPHILS NFR BLD AUTO: 66.9 %
OSMOLALITY SERPL CALC.SUM OF ELEC: 285 MOSM/KG (ref 275–295)
PLATELET # BLD AUTO: 456 10(3)UL (ref 150–450)
POTASSIUM SERPL-SCNC: 3.8 MMOL/L (ref 3.5–5.1)
RBC # BLD AUTO: 4.29 X10(6)UL (ref 3.8–5.3)
RH BLOOD TYPE: NEGATIVE
SODIUM SERPL-SCNC: 138 MMOL/L (ref 136–145)
WBC # BLD AUTO: 11.5 X10(3) UL (ref 4–11)

## 2020-01-11 PROCEDURE — 99233 SBSQ HOSP IP/OBS HIGH 50: CPT | Performed by: HOSPITALIST

## 2020-01-11 PROCEDURE — 99232 SBSQ HOSP IP/OBS MODERATE 35: CPT | Performed by: INTERNAL MEDICINE

## 2020-01-11 RX ORDER — POTASSIUM CHLORIDE 20 MEQ/1
40 TABLET, EXTENDED RELEASE ORAL ONCE
Status: COMPLETED | OUTPATIENT
Start: 2020-01-11 | End: 2020-01-11

## 2020-01-11 NOTE — CONSULTS
[facility] HOSP - [facility]    Consult Note    Date:  1/10/2020  Date of Admission:  1/10/2020      Chief Complaint:   Estrelliat Ann is a(n) 38year old female with dyspnea and recurrent pneumothorax.     HPI:   The patient has history of tobacco use having of death; d.57, ex-smoker   • Other (Other) Brother         intellectual disability   • Ovarian Cancer Maternal Aunt 45        d.45; ovarian ca   • Cancer Maternal Uncle 46        d.47; lung ca, smoker   • Breast Cancer Other    • Diabetes Neg    • Hyperte 01/10/2020    HGB 15.4 01/10/2020    HCT 44.5 01/10/2020    .0 01/10/2020    CREATSERUM 0.80 01/10/2020    BUN 10 01/10/2020     01/10/2020    K 3.9 01/10/2020     01/10/2020    CO2 29.0 01/10/2020    GLU 86 01/10/2020    CA 9.3 01/10/20

## 2020-01-11 NOTE — H&P
[address] [facility] Patient Status:  Emergency    1975 MRN [id number]   Location [address] Attending Gilbert Gunderson MD   Hosp Day # 0 PCP Hugh Butcher MD     Date:  1/10 10/04/2011    x 2   • LAPAROSCOPIC CHOLECYSTECTOMY  2006   • TUBAL LIGATION  2000    laparascopic     Family History   Problem Relation Age of Onset   • Breast Cancer Mother 46        cause of death; d.56   • Cancer Maternal Uncle 56        tongue cancer-c Cranial nerves II through XII were intact.       Cervical Papanicolaou to be done in MD's office    Results:     Lab Results   Component Value Date    WBC 10.8 01/10/2020    HGB 15.4 01/10/2020    HCT 44.5 01/10/2020    .0 (H) 01/10/2020    CREATSERU

## 2020-01-11 NOTE — PROGRESS NOTES
[facility]    Progress Note    Donna Somers Patient Status:  Inpatient    1975 MRN [id number]   Location [facility] 5SW/SE Attending Kristen Sloan MD   Hosp Day # 1 PCP Meron Freeman MD       Subjective:   Karen Person mg, Intravenous, Q2H PRN  •  docusate sodium (COLACE) cap 100 mg, 100 mg, Oral, BID  •  PEG 3350 (MIRALAX) powder packet 17 g, 17 g, Oral, Daily PRN  •  magnesium hydroxide (MILK OF MAGNESIA) 400 MG/5ML suspension 30 mL, 30 mL, Oral, Daily PRN  •  bisacody Xr Chest Pa + Lat Chest (cpt=71046)    Result Date: 1/10/2020  CONCLUSION:  1. Large recurrent right pneumothorax.     Dictated by (CST): Kaleb Abdi MD on 1/10/2020 at 20:59     Approved by (CST): Kaleb Abdi MD on 1/10/2020 at 21:09          [facility]

## 2020-01-11 NOTE — ED PROVIDER NOTES
Patient Seen in: [facility] AND [facility] Emergency Department      History   Patient presents with:  Dyspnea BENJAMÍN SOB    Stated Complaint: Pneumothorax    HPI    41-year-old female with history of recent spontaneous pneumothorax for which she was hospitalized f systems are as noted in HPI. Constitutional and vital signs reviewed. All other systems reviewed and negative except as noted above.     Physical Exam     ED Triage Vitals [01/10/20 1739]   /83   Pulse 101   Resp 26   Temp 98 °F (36.7 °C)   Temp pulmonology. He evaluated the patient in the ED and placed a thoracostomy tube. Will admit for further treatment of pneumothorax.   Discussed with Dr. Melvin Tripp, hospitalist.  Admission disposition: 1/10/2020  7:44 PM                   Disposition and Plan

## 2020-01-11 NOTE — PLAN OF CARE
Problem: Patient/Family Goals  Goal: Patient/Family Long Term Goal  Description  Patient's Long Term Goal: \"to not have this problem again\"    Interventions:  - Pulm on consult, thoracic surgery consult  - chest tube in place  - See additional Care Renetta activity and pre-medicate as appropriate  Outcome: Progressing     Problem: RISK FOR INFECTION - ADULT  Goal: Absence of fever/infection during anticipated neutropenic period  Description  INTERVENTIONS  - Monitor WBC  - Administer growth factors as ordere oxygenation  Description  INTERVENTIONS:  - Assess for changes in respiratory status  - Assess for changes in mentation and behavior  - Position to facilitate oxygenation and minimize respiratory effort  - Oxygen supplementation based on oxygen saturation

## 2020-01-11 NOTE — PROGRESS NOTES
[facility]    Progress Note      Assessment and Plan:    1. Recurrent pneumothorax– the chest is now nearly completely reexpanded on the right with a small bore anterior chest tube.   I appreciate thoracic surgery opinion.     Recommendatio

## 2020-01-11 NOTE — ED NOTES
Orders for admission, patient is aware of plan and ready to go upstairs.  Any questions, please call ED RN Renu Montiel  at extension [phone]

## 2020-01-11 NOTE — CONSULTS
Thoracic Surgery Consult    Reason for Consultation: recurrent R spont ptx    Consulting Physician: Shirley Marlena    Chief Complaint: \" I was coughing and had shortness of breath .  \"    History of Present Illness: Patient is a 38year old, female with recent hx Comment: occasionally      Family History:    Family History   Problem Relation Age of Onset   • Breast Cancer Mother 46        cause of death; d.56   • Cancer Maternal Uncle 64        tongue cancer-cause of death; d.57, ex-smoker   • Other (Other) Garret blebs seen on CT. Counseled smoking cessation. Recommend right VATS bleb resection and mechanical pleurodesis. Described risks (bleeding, lung injury, infection, recurrence of ptx/failure of pleurodesis) and alternatives (no surgery).  Pt agrees to procee

## 2020-01-11 NOTE — PROCEDURES
[facility]  Procedure Note  01/10/20    Elridge Fears Patient Status:  Emergency    1975 MRN [id number]   Location [address] Attending Isaiah Carlson MD   Hosp Day # 0 PCP Michelle Villatoro MD     Procedu

## 2020-01-12 ENCOUNTER — ANESTHESIA (OUTPATIENT)
Dept: SURGERY | Facility: HOSPITAL | Age: 45
DRG: 165 | End: 2020-01-12
Payer: COMMERCIAL

## 2020-01-12 ENCOUNTER — ANESTHESIA EVENT (OUTPATIENT)
Dept: SURGERY | Facility: HOSPITAL | Age: 45
DRG: 165 | End: 2020-01-12
Payer: COMMERCIAL

## 2020-01-12 LAB
ANION GAP SERPL CALC-SCNC: 5 MMOL/L (ref 0–18)
BASOPHILS # BLD AUTO: 0.08 X10(3) UL (ref 0–0.2)
BASOPHILS NFR BLD AUTO: 1.2 %
BUN BLD-MCNC: 6 MG/DL (ref 7–18)
BUN/CREAT SERPL: 9.2 (ref 10–20)
CALCIUM BLD-MCNC: 8.6 MG/DL (ref 8.5–10.1)
CHLORIDE SERPL-SCNC: 108 MMOL/L (ref 98–112)
CO2 SERPL-SCNC: 25 MMOL/L (ref 21–32)
CREAT BLD-MCNC: 0.65 MG/DL (ref 0.55–1.02)
DEPRECATED RDW RBC AUTO: 41.1 FL (ref 35.1–46.3)
EOSINOPHIL # BLD AUTO: 0.52 X10(3) UL (ref 0–0.7)
EOSINOPHIL NFR BLD AUTO: 7.8 %
ERYTHROCYTE [DISTWIDTH] IN BLOOD BY AUTOMATED COUNT: 12.2 % (ref 11–15)
GLUCOSE BLD-MCNC: 94 MG/DL (ref 70–99)
HCT VFR BLD AUTO: 39.8 % (ref 35–48)
HGB BLD-MCNC: 13.4 G/DL (ref 12–16)
IMM GRANULOCYTES # BLD AUTO: 0.02 X10(3) UL (ref 0–1)
IMM GRANULOCYTES NFR BLD: 0.3 %
INR BLD: 1 (ref 0.9–1.2)
LYMPHOCYTES # BLD AUTO: 2.99 X10(3) UL (ref 1–4)
LYMPHOCYTES NFR BLD AUTO: 44.8 %
MCH RBC QN AUTO: 30.9 PG (ref 26–34)
MCHC RBC AUTO-ENTMCNC: 33.7 G/DL (ref 31–37)
MCV RBC AUTO: 91.9 FL (ref 80–100)
MONOCYTES # BLD AUTO: 0.6 X10(3) UL (ref 0.1–1)
MONOCYTES NFR BLD AUTO: 9 %
NEUTROPHILS # BLD AUTO: 2.47 X10 (3) UL (ref 1.5–7.7)
NEUTROPHILS # BLD AUTO: 2.47 X10(3) UL (ref 1.5–7.7)
NEUTROPHILS NFR BLD AUTO: 36.9 %
OSMOLALITY SERPL CALC.SUM OF ELEC: 283 MOSM/KG (ref 275–295)
PLATELET # BLD AUTO: 433 10(3)UL (ref 150–450)
POTASSIUM SERPL-SCNC: 4.2 MMOL/L (ref 3.5–5.1)
PROTHROMBIN TIME: 13 SECONDS (ref 11.8–14.5)
RBC # BLD AUTO: 4.33 X10(6)UL (ref 3.8–5.3)
SODIUM SERPL-SCNC: 138 MMOL/L (ref 136–145)
WBC # BLD AUTO: 6.7 X10(3) UL (ref 4–11)

## 2020-01-12 PROCEDURE — 0BBC4ZZ EXCISION OF RIGHT UPPER LUNG LOBE, PERCUTANEOUS ENDOSCOPIC APPROACH: ICD-10-PCS | Performed by: THORACIC SURGERY (CARDIOTHORACIC VASCULAR SURGERY)

## 2020-01-12 PROCEDURE — 99233 SBSQ HOSP IP/OBS HIGH 50: CPT | Performed by: HOSPITALIST

## 2020-01-12 PROCEDURE — 99232 SBSQ HOSP IP/OBS MODERATE 35: CPT | Performed by: INTERNAL MEDICINE

## 2020-01-12 PROCEDURE — 3E0T3BZ INTRODUCTION OF ANESTHETIC AGENT INTO PERIPHERAL NERVES AND PLEXI, PERCUTANEOUS APPROACH: ICD-10-PCS | Performed by: THORACIC SURGERY (CARDIOTHORACIC VASCULAR SURGERY)

## 2020-01-12 PROCEDURE — 0B5N4ZZ DESTRUCTION OF RIGHT PLEURA, PERCUTANEOUS ENDOSCOPIC APPROACH: ICD-10-PCS | Performed by: THORACIC SURGERY (CARDIOTHORACIC VASCULAR SURGERY)

## 2020-01-12 PROCEDURE — 0BJ08ZZ INSPECTION OF TRACHEOBRONCHIAL TREE, VIA NATURAL OR ARTIFICIAL OPENING ENDOSCOPIC: ICD-10-PCS | Performed by: THORACIC SURGERY (CARDIOTHORACIC VASCULAR SURGERY)

## 2020-01-12 RX ORDER — ACETAMINOPHEN 500 MG
1000 TABLET ORAL ONCE
Status: DISCONTINUED | OUTPATIENT
Start: 2020-01-12 | End: 2020-01-12 | Stop reason: HOSPADM

## 2020-01-12 RX ORDER — HEPARIN SODIUM 5000 [USP'U]/ML
5000 INJECTION, SOLUTION INTRAVENOUS; SUBCUTANEOUS EVERY 12 HOURS SCHEDULED
Status: DISCONTINUED | OUTPATIENT
Start: 2020-01-12 | End: 2020-01-12

## 2020-01-12 RX ORDER — NEOSTIGMINE METHYLSULFATE 0.5 MG/ML
INJECTION INTRAVENOUS AS NEEDED
Status: DISCONTINUED | OUTPATIENT
Start: 2020-01-12 | End: 2020-01-12 | Stop reason: SURG

## 2020-01-12 RX ORDER — ONDANSETRON 2 MG/ML
4 INJECTION INTRAMUSCULAR; INTRAVENOUS ONCE AS NEEDED
Status: DISCONTINUED | OUTPATIENT
Start: 2020-01-12 | End: 2020-01-12 | Stop reason: HOSPADM

## 2020-01-12 RX ORDER — DEXAMETHASONE SODIUM PHOSPHATE 4 MG/ML
VIAL (ML) INJECTION AS NEEDED
Status: DISCONTINUED | OUTPATIENT
Start: 2020-01-12 | End: 2020-01-12 | Stop reason: SURG

## 2020-01-12 RX ORDER — CLINDAMYCIN PHOSPHATE 150 MG/ML
INJECTION, SOLUTION INTRAVENOUS AS NEEDED
Status: DISCONTINUED | OUTPATIENT
Start: 2020-01-12 | End: 2020-01-12 | Stop reason: SURG

## 2020-01-12 RX ORDER — SODIUM CHLORIDE 9 MG/ML
INJECTION, SOLUTION INTRAVENOUS CONTINUOUS
Status: DISCONTINUED | OUTPATIENT
Start: 2020-01-12 | End: 2020-01-13

## 2020-01-12 RX ORDER — MORPHINE SULFATE 10 MG/ML
6 INJECTION, SOLUTION INTRAMUSCULAR; INTRAVENOUS EVERY 10 MIN PRN
Status: DISCONTINUED | OUTPATIENT
Start: 2020-01-12 | End: 2020-01-12 | Stop reason: HOSPADM

## 2020-01-12 RX ORDER — NALOXONE HYDROCHLORIDE 0.4 MG/ML
80 INJECTION, SOLUTION INTRAMUSCULAR; INTRAVENOUS; SUBCUTANEOUS AS NEEDED
Status: DISCONTINUED | OUTPATIENT
Start: 2020-01-12 | End: 2020-01-12 | Stop reason: HOSPADM

## 2020-01-12 RX ORDER — HALOPERIDOL 5 MG/ML
0.25 INJECTION INTRAMUSCULAR ONCE AS NEEDED
Status: DISCONTINUED | OUTPATIENT
Start: 2020-01-12 | End: 2020-01-12 | Stop reason: HOSPADM

## 2020-01-12 RX ORDER — SODIUM CHLORIDE 0.9 % (FLUSH) 0.9 %
10 SYRINGE (ML) INJECTION AS NEEDED
Status: DISCONTINUED | OUTPATIENT
Start: 2020-01-12 | End: 2020-01-16

## 2020-01-12 RX ORDER — HYDROMORPHONE HYDROCHLORIDE 1 MG/ML
0.4 INJECTION, SOLUTION INTRAMUSCULAR; INTRAVENOUS; SUBCUTANEOUS EVERY 5 MIN PRN
Status: DISCONTINUED | OUTPATIENT
Start: 2020-01-12 | End: 2020-01-12 | Stop reason: HOSPADM

## 2020-01-12 RX ORDER — HYDROMORPHONE HYDROCHLORIDE 1 MG/ML
0.6 INJECTION, SOLUTION INTRAMUSCULAR; INTRAVENOUS; SUBCUTANEOUS EVERY 5 MIN PRN
Status: DISCONTINUED | OUTPATIENT
Start: 2020-01-12 | End: 2020-01-12 | Stop reason: HOSPADM

## 2020-01-12 RX ORDER — HYDROCODONE BITARTRATE AND ACETAMINOPHEN 5; 325 MG/1; MG/1
1 TABLET ORAL AS NEEDED
Status: DISCONTINUED | OUTPATIENT
Start: 2020-01-12 | End: 2020-01-12 | Stop reason: HOSPADM

## 2020-01-12 RX ORDER — ONDANSETRON 2 MG/ML
INJECTION INTRAMUSCULAR; INTRAVENOUS AS NEEDED
Status: DISCONTINUED | OUTPATIENT
Start: 2020-01-12 | End: 2020-01-12 | Stop reason: SURG

## 2020-01-12 RX ORDER — MORPHINE SULFATE 4 MG/ML
2 INJECTION, SOLUTION INTRAMUSCULAR; INTRAVENOUS EVERY 10 MIN PRN
Status: DISCONTINUED | OUTPATIENT
Start: 2020-01-12 | End: 2020-01-12 | Stop reason: HOSPADM

## 2020-01-12 RX ORDER — BUPIVACAINE HYDROCHLORIDE 2.5 MG/ML
INJECTION, SOLUTION EPIDURAL; INFILTRATION; INTRACAUDAL AS NEEDED
Status: DISCONTINUED | OUTPATIENT
Start: 2020-01-12 | End: 2020-01-12 | Stop reason: HOSPADM

## 2020-01-12 RX ORDER — SODIUM CHLORIDE 9 MG/ML
INJECTION, SOLUTION INTRAVENOUS CONTINUOUS PRN
Status: DISCONTINUED | OUTPATIENT
Start: 2020-01-12 | End: 2020-01-12 | Stop reason: SURG

## 2020-01-12 RX ORDER — HYDROCODONE BITARTRATE AND ACETAMINOPHEN 5; 325 MG/1; MG/1
2 TABLET ORAL AS NEEDED
Status: DISCONTINUED | OUTPATIENT
Start: 2020-01-12 | End: 2020-01-12 | Stop reason: HOSPADM

## 2020-01-12 RX ORDER — SODIUM CHLORIDE, SODIUM LACTATE, POTASSIUM CHLORIDE, CALCIUM CHLORIDE 600; 310; 30; 20 MG/100ML; MG/100ML; MG/100ML; MG/100ML
INJECTION, SOLUTION INTRAVENOUS CONTINUOUS
Status: DISCONTINUED | OUTPATIENT
Start: 2020-01-12 | End: 2020-01-12

## 2020-01-12 RX ORDER — MORPHINE SULFATE 4 MG/ML
4 INJECTION, SOLUTION INTRAMUSCULAR; INTRAVENOUS EVERY 10 MIN PRN
Status: DISCONTINUED | OUTPATIENT
Start: 2020-01-12 | End: 2020-01-12 | Stop reason: HOSPADM

## 2020-01-12 RX ORDER — GLYCOPYRROLATE 0.2 MG/ML
INJECTION INTRAMUSCULAR; INTRAVENOUS AS NEEDED
Status: DISCONTINUED | OUTPATIENT
Start: 2020-01-12 | End: 2020-01-12 | Stop reason: SURG

## 2020-01-12 RX ORDER — LIDOCAINE HYDROCHLORIDE 10 MG/ML
INJECTION, SOLUTION EPIDURAL; INFILTRATION; INTRACAUDAL; PERINEURAL AS NEEDED
Status: DISCONTINUED | OUTPATIENT
Start: 2020-01-12 | End: 2020-01-12 | Stop reason: SURG

## 2020-01-12 RX ORDER — PHENYLEPHRINE HCL 10 MG/ML
VIAL (ML) INJECTION AS NEEDED
Status: DISCONTINUED | OUTPATIENT
Start: 2020-01-12 | End: 2020-01-12 | Stop reason: SURG

## 2020-01-12 RX ORDER — PROCHLORPERAZINE EDISYLATE 5 MG/ML
5 INJECTION INTRAMUSCULAR; INTRAVENOUS ONCE AS NEEDED
Status: DISCONTINUED | OUTPATIENT
Start: 2020-01-12 | End: 2020-01-12 | Stop reason: HOSPADM

## 2020-01-12 RX ORDER — ROCURONIUM BROMIDE 10 MG/ML
INJECTION, SOLUTION INTRAVENOUS AS NEEDED
Status: DISCONTINUED | OUTPATIENT
Start: 2020-01-12 | End: 2020-01-12 | Stop reason: SURG

## 2020-01-12 RX ORDER — SODIUM CHLORIDE, SODIUM LACTATE, POTASSIUM CHLORIDE, CALCIUM CHLORIDE 600; 310; 30; 20 MG/100ML; MG/100ML; MG/100ML; MG/100ML
INJECTION, SOLUTION INTRAVENOUS CONTINUOUS
Status: DISCONTINUED | OUTPATIENT
Start: 2020-01-12 | End: 2020-01-12 | Stop reason: HOSPADM

## 2020-01-12 RX ORDER — HYDROMORPHONE HYDROCHLORIDE 1 MG/ML
0.2 INJECTION, SOLUTION INTRAMUSCULAR; INTRAVENOUS; SUBCUTANEOUS EVERY 5 MIN PRN
Status: DISCONTINUED | OUTPATIENT
Start: 2020-01-12 | End: 2020-01-12 | Stop reason: HOSPADM

## 2020-01-12 RX ORDER — MIDAZOLAM HYDROCHLORIDE 1 MG/ML
INJECTION INTRAMUSCULAR; INTRAVENOUS AS NEEDED
Status: DISCONTINUED | OUTPATIENT
Start: 2020-01-12 | End: 2020-01-12 | Stop reason: SURG

## 2020-01-12 RX ADMIN — GLYCOPYRROLATE 0.4 MG: 0.2 INJECTION INTRAMUSCULAR; INTRAVENOUS at 09:20:00

## 2020-01-12 RX ADMIN — DEXAMETHASONE SODIUM PHOSPHATE 4 MG: 4 MG/ML VIAL (ML) INJECTION at 07:56:00

## 2020-01-12 RX ADMIN — MIDAZOLAM HYDROCHLORIDE 2 MG: 1 INJECTION INTRAMUSCULAR; INTRAVENOUS at 07:53:00

## 2020-01-12 RX ADMIN — SODIUM CHLORIDE, SODIUM LACTATE, POTASSIUM CHLORIDE, CALCIUM CHLORIDE: 600; 310; 30; 20 INJECTION, SOLUTION INTRAVENOUS at 07:53:00

## 2020-01-12 RX ADMIN — PHENYLEPHRINE HCL 50 MCG: 10 MG/ML VIAL (ML) INJECTION at 08:45:00

## 2020-01-12 RX ADMIN — SODIUM CHLORIDE: 9 INJECTION, SOLUTION INTRAVENOUS at 09:07:00

## 2020-01-12 RX ADMIN — ROCURONIUM BROMIDE 50 MG: 10 INJECTION, SOLUTION INTRAVENOUS at 07:54:00

## 2020-01-12 RX ADMIN — LIDOCAINE HYDROCHLORIDE 50 MG: 10 INJECTION, SOLUTION EPIDURAL; INFILTRATION; INTRACAUDAL; PERINEURAL at 07:54:00

## 2020-01-12 RX ADMIN — SODIUM CHLORIDE, SODIUM LACTATE, POTASSIUM CHLORIDE, CALCIUM CHLORIDE: 600; 310; 30; 20 INJECTION, SOLUTION INTRAVENOUS at 09:07:00

## 2020-01-12 RX ADMIN — GLYCOPYRROLATE 0.2 MG: 0.2 INJECTION INTRAMUSCULAR; INTRAVENOUS at 07:54:00

## 2020-01-12 RX ADMIN — PHENYLEPHRINE HCL 50 MCG: 10 MG/ML VIAL (ML) INJECTION at 08:47:00

## 2020-01-12 RX ADMIN — ONDANSETRON 4 MG: 2 INJECTION INTRAMUSCULAR; INTRAVENOUS at 07:56:00

## 2020-01-12 RX ADMIN — CLINDAMYCIN PHOSPHATE 900 MG: 150 INJECTION, SOLUTION INTRAVENOUS at 08:22:00

## 2020-01-12 RX ADMIN — NEOSTIGMINE METHYLSULFATE 3 MG: 0.5 INJECTION INTRAVENOUS at 09:20:00

## 2020-01-12 RX ADMIN — SODIUM CHLORIDE: 9 INJECTION, SOLUTION INTRAVENOUS at 08:12:00

## 2020-01-12 NOTE — BRIEF OP NOTE
Pre-Operative Diagnosis: Recurrent spontaneous pneumothorax [J93.83]     Post-Operative Diagnosis: Recurrent spontaneous pneumothorax [J93.83]      Procedure Performed:   Procedure(s):  RIGHT VIDEO ASSISTED THORACOSCOPIC BLEB RESECTION AND MECHANICAL PLEUR

## 2020-01-12 NOTE — PLAN OF CARE
Problem: Patient/Family Goals  Goal: Patient/Family Long Term Goal  Description  Patient's Long Term Goal: \"to not have this problem again\"    Interventions:  - Pulm on consult, thoracic surgery consult  - chest tube in place  - See additional Care Renetta activity and pre-medicate as appropriate  Outcome: Progressing     Problem: RISK FOR INFECTION - ADULT  Goal: Absence of fever/infection during anticipated neutropenic period  Description  INTERVENTIONS  - Monitor WBC  - Administer growth factors as ordere oxygenation  Description  INTERVENTIONS:  - Assess for changes in respiratory status  - Assess for changes in mentation and behavior  - Position to facilitate oxygenation and minimize respiratory effort  - Oxygen supplementation based on oxygen saturation volume excess or deficit  - Monitor intake, output and patient weight  - Monitor urine specific gravity, serum osmolarity and serum sodium as indicated or ordered  - Monitor response to interventions for patient's volume status, including labs, urine outpu

## 2020-01-12 NOTE — ANESTHESIA PROCEDURE NOTES
Airway  Urgency: Elective      General Information and Staff    Patient location during procedure: OR  Anesthesiologist: Uvaldo Sheffield MD  Performed: anesthesiologist     Indications and Patient Condition  Indications for airway management: anesthesia  Sp

## 2020-01-12 NOTE — PROGRESS NOTES
01/11/20 1931   Clinical Encounter Type   Visited With Patient   Routine Visit Introduction   Continue Visiting Yes   Surgical Visit Pre-op   Patient's Supportive Strategies/Resources bruce   Patient Spiritual Encounters   Spiritual Assessment Completed

## 2020-01-12 NOTE — PROGRESS NOTES
[facility]    Progress Note      Assessment and Plan:    1. Recurrent pneumothorax– the patient has now tolerated blebectomy and mechanical pleurodesis. Postoperative pain. .     Recommendations:  1. Ongoing suction to chest tube  2.    P

## 2020-01-12 NOTE — ANESTHESIA PROCEDURE NOTES
Arterial Line  Performed by: Dariela Damian MD  Authorized by: Dariela Damian MD     General Information and Staff    Procedure Start:  1/12/2020 8:04 AM  Procedure End:  1/12/2020 8:08 AM  Anesthesiologist:  Dariela Damian MD  Performed By:  Carlie Hernandez

## 2020-01-12 NOTE — ANESTHESIA PREPROCEDURE EVALUATION
Anesthesia PreOp Note    HPI:     Ester Merchant is a 38year old female who presents for preoperative consultation requested by: Phillip Nielson MD    Date of Surgery: 1/10/2020 - 1/12/2020    Procedure(s):  THORACOSCOPY/VATS  Indication: Recurrent RIGHT  2018    negative   • CYSTOSCOPY N/A 3/10/2017    Performed by Dimitry Vences MD at [facility] OR   •  [facility]  2013, 2017   • D & C  1994   • HC PUNCTURE ASPIRATION BREAST CYST Right 2018   • HC PUNCTURE ASPIRATION BREAST CYST Right 10/0 Intravenous, Q2H PRN, Lakeshia Kaur MD  [facility] Hold] docusate sodium (COLACE) cap 100 mg, 100 mg, Oral, BID, Hamilton Bates MD, 100 mg at 01/11/20 2106  [MAR Hold] PEG 3350 (MIRALAX) powder packet 17 g, 17 g, Oral, Daily PRN, Lakeshia Kaur MD  [facility] H Marital status: Single      Spouse name: Not on file      Number of children: 2      Years of education: Not on file      Highest education level: Not on file    Occupational History      Occupation: Coding, billing        Employer: [employer] Self-Exams: Not Asked    Social History Narrative      Not on file      Available pre-op labs reviewed.   Lab Results   Component Value Date    WBC 6.7 01/12/2020    RBC 4.33 01/12/2020    HGB 13.4 01/12/2020    HCT 39.8 01/12/2020    MCV 91.9 01/12/2020 dental damage if relevant, major complications, and any alternative forms of anesthetic management. All of the patient's questions were answered to the best of my ability. The patient desires the anesthetic management as planned.   ADELINA HOOD  1/12/2020

## 2020-01-12 NOTE — ANESTHESIA POSTPROCEDURE EVALUATION
Patient: Davey Hargrove    Procedure Summary     Date:  01/12/20 Room / Location:  [address] [place] [address] MAIN OR 04 / [address] [place] [address] MAIN OR    Anesthesia Start:  5804 Anesthesia Stop:      Procedure:  THORACOSCOPY/VATS (Right ) Diagnosis:       Recurrent spontaneous pneumothorax

## 2020-01-12 NOTE — PROGRESS NOTES
[facility]    Progress Note    Catarina Ragland Patient Status:  Inpatient    1975 MRN [id number]   Location [facility] 5SW/SE Attending Ricky Rangel MD   Hosp Day # 2 PCP Nela Faustin MD       Subjective:   Onelia Damico (PF) 4 MG/ML injection 4 mg, 4 mg, Intravenous, Q2H PRN **OR** morphINE sulfate (PF) 4 MG/ML injection 6 mg, 6 mg, Intravenous, Q2H PRN  •  docusate sodium (COLACE) cap 100 mg, 100 mg, Oral, BID  •  PEG 3350 (MIRALAX) powder packet 17 g, 17 g, Oral, Daily NEPRELIM 5.55 7.66 2.47   WBC 10.8 11.5* 6.7   .0* 456.0* 433.0         Recent Labs   Lab 01/10/20  1823 01/11/20  0710 01/12/20  0530   GLU 86 100* 94   BUN 10 10 6*   CREATSERUM 0.80 0.72 0.65   GFRAA 103 117 124   GFRNAA 89 101 108   CA 9.3 8.2

## 2020-01-13 ENCOUNTER — APPOINTMENT (OUTPATIENT)
Dept: GENERAL RADIOLOGY | Facility: HOSPITAL | Age: 45
DRG: 165 | End: 2020-01-13
Attending: INTERNAL MEDICINE
Payer: COMMERCIAL

## 2020-01-13 LAB
ANION GAP SERPL CALC-SCNC: 5 MMOL/L (ref 0–18)
BASOPHILS # BLD AUTO: 0.03 X10(3) UL (ref 0–0.2)
BASOPHILS NFR BLD AUTO: 0.3 %
BUN BLD-MCNC: 4 MG/DL (ref 7–18)
BUN/CREAT SERPL: 6.1 (ref 10–20)
CALCIUM BLD-MCNC: 8.4 MG/DL (ref 8.5–10.1)
CHLORIDE SERPL-SCNC: 105 MMOL/L (ref 98–112)
CO2 SERPL-SCNC: 26 MMOL/L (ref 21–32)
CREAT BLD-MCNC: 0.66 MG/DL (ref 0.55–1.02)
DEPRECATED RDW RBC AUTO: 40.2 FL (ref 35.1–46.3)
EOSINOPHIL # BLD AUTO: 0.06 X10(3) UL (ref 0–0.7)
EOSINOPHIL NFR BLD AUTO: 0.5 %
ERYTHROCYTE [DISTWIDTH] IN BLOOD BY AUTOMATED COUNT: 12 % (ref 11–15)
GLUCOSE BLD-MCNC: 102 MG/DL (ref 70–99)
HCT VFR BLD AUTO: 37.7 % (ref 35–48)
HGB BLD-MCNC: 12.4 G/DL (ref 12–16)
IMM GRANULOCYTES # BLD AUTO: 0.04 X10(3) UL (ref 0–1)
IMM GRANULOCYTES NFR BLD: 0.4 %
LYMPHOCYTES # BLD AUTO: 2.86 X10(3) UL (ref 1–4)
LYMPHOCYTES NFR BLD AUTO: 25.1 %
MCH RBC QN AUTO: 30.1 PG (ref 26–34)
MCHC RBC AUTO-ENTMCNC: 32.9 G/DL (ref 31–37)
MCV RBC AUTO: 91.5 FL (ref 80–100)
MONOCYTES # BLD AUTO: 1.09 X10(3) UL (ref 0.1–1)
MONOCYTES NFR BLD AUTO: 9.6 %
NEUTROPHILS # BLD AUTO: 7.31 X10 (3) UL (ref 1.5–7.7)
NEUTROPHILS # BLD AUTO: 7.31 X10(3) UL (ref 1.5–7.7)
NEUTROPHILS NFR BLD AUTO: 64.1 %
OSMOLALITY SERPL CALC.SUM OF ELEC: 279 MOSM/KG (ref 275–295)
PLATELET # BLD AUTO: 439 10(3)UL (ref 150–450)
POTASSIUM SERPL-SCNC: 4 MMOL/L (ref 3.5–5.1)
RBC # BLD AUTO: 4.12 X10(6)UL (ref 3.8–5.3)
SODIUM SERPL-SCNC: 136 MMOL/L (ref 136–145)
WBC # BLD AUTO: 11.4 X10(3) UL (ref 4–11)

## 2020-01-13 PROCEDURE — 99233 SBSQ HOSP IP/OBS HIGH 50: CPT | Performed by: INTERNAL MEDICINE

## 2020-01-13 PROCEDURE — 71045 X-RAY EXAM CHEST 1 VIEW: CPT | Performed by: INTERNAL MEDICINE

## 2020-01-13 PROCEDURE — 99233 SBSQ HOSP IP/OBS HIGH 50: CPT | Performed by: HOSPITALIST

## 2020-01-13 RX ORDER — ACETAMINOPHEN 500 MG
1000 TABLET ORAL EVERY 6 HOURS SCHEDULED
Status: DISCONTINUED | OUTPATIENT
Start: 2020-01-13 | End: 2020-01-16

## 2020-01-13 RX ORDER — OXYCODONE HYDROCHLORIDE 5 MG/1
10 TABLET ORAL EVERY 4 HOURS PRN
Status: DISCONTINUED | OUTPATIENT
Start: 2020-01-13 | End: 2020-01-16

## 2020-01-13 RX ORDER — TAMSULOSIN HYDROCHLORIDE 0.4 MG/1
0.4 CAPSULE ORAL DAILY
Status: COMPLETED | OUTPATIENT
Start: 2020-01-13 | End: 2020-01-15

## 2020-01-13 RX ORDER — OXYCODONE HYDROCHLORIDE 5 MG/1
5 TABLET ORAL EVERY 4 HOURS PRN
Status: DISCONTINUED | OUTPATIENT
Start: 2020-01-13 | End: 2020-01-16

## 2020-01-13 NOTE — PROGRESS NOTES
[facility]    Progress Note    Catherine Epps Patient Status:  Inpatient    1975 MRN [id number]   Location [facility] 5SW/SE Attending Lane Dow MD   Hosp Day # 3 PCP Kenna Hickey MD       Subjective:   Franc Jason Intravenous, PRN  •  morphINE sulfate (PF) 2 MG/ML injection 2 mg, 2 mg, Intravenous, Q2H PRN **OR** morphINE sulfate (PF) 4 MG/ML injection 4 mg, 4 mg, Intravenous, Q2H PRN **OR** morphINE sulfate (PF) 4 MG/ML injection 6 mg, 6 mg, Intravenous, Q2H PRN  • plan and workup    Results:     Recent Labs   Lab 01/11/20  0710 01/12/20  0530 01/13/20  0444   RBC 4.29 4.33 4.12   HGB 13.4 13.4 12.4   HCT 39.1 39.8 37.7   MCV 91.1 91.9 91.5   MCH 31.2 30.9 30.1   MCHC 34.3 33.7 32.9   RDW 12.5 12.2 12.0   NEPRELIM 7.

## 2020-01-13 NOTE — PROGRESS NOTES
[facility]    Progress Note    eGn Wray Patient Status:  Inpatient    1975 MRN [id number]   Location [facility] 2W/SW Attending Lissa Hood MD   Hosp Day # 3 PCP Renard Iverson MD        Subjective:     Constit Results:     Lab Results   Component Value Date    WBC 11.4 (H) 01/13/2020    HGB 12.4 01/13/2020    HCT 37.7 01/13/2020    .0 01/13/2020    CREATSERUM 0.66 01/13/2020    BUN 4 (L) 01/13/2020     01/13/2020    K 4.0 01/13/2020     01/1

## 2020-01-13 NOTE — OPERATIVE REPORT
[facility]    PATIENT'S NAME: Gadiel Baig   ATTENDING PHYSICIAN: Jf Velásquez MD   OPERATING PHYSICIAN: Denzel Jones.  Ute Vazquez MD   PATIENT ACCOUNT#:   [id number]    LOCATION:  [address] [place] [address] RECORD #:   [id number]       DATE OF GENOVEVA confirm patient, site, and site of surgery. I made a 2 cm incision in the inframammary crease, anterior axillary line, and into the chest bluntly. A camera through this incision revealed I was safely in chest space.   I then made an additional incision in Spencer Mims MD  d: 01/12/2020 09:14:19  t: 01/12/2020 18:08:57  [facility] [id number]  EOM/    cc: Maria Fernanda Gibson MD

## 2020-01-13 NOTE — PROGRESS NOTES
Thoracic Surgery Progress Note     Catherine Epps is a 38year old female. MRN [id number]. Admitted 1/10/2020    [address] [place] [address] EVENTS:     +pain at chest tube site, using morphine frequently. +urinary retention, straight cathed and had orr replaced. resection POD 1    Ambulate 3-4 times per day in the halls  Spend majority of day out of bed, in chair  Encouraged cough and IS use  Pain management switch to scheduled tylenol and oxycodone, lidocaine patches, use morphine for breakthrough pain.   Cepacol

## 2020-01-13 NOTE — PAYOR COMM NOTE
--------------  ADMISSION REVIEW     Payor: [facility] PPO  Subscriber #:  [id number]  Authorization Number: [id number]    Admit date: 1/10/20  Admit time: 2040       Admitting Physician: Chantal Gregory MD  Attending Physician:  Alexandro Goldsmith MD  [facility] 1/10/2020          ICD-10-CM Noted POA    Pneumothorax J93.9 1/10/2020 Unknown                     [facility]    History & Physical    Saroj Solitario Patient Status:  Emergency    1975 MRN [id number]   Location [address] 01/12/20 1011 — 69 14 — 99 % — Nasal cannula 3 L/min NA   01/12/20 1001 — 67 26 118/93Abnormal  98 % — Nasal cannula 3 L/min NA   01/12/20 0951 — 76 23 126/108Abnormal  96 % — Nasal cannula 3 L/min NA   01/12/20 0941 — 84 28Abnormal  119/86 96 % — Nasal serosanguineous   24 hour:  745 cc  Suction: Yes        Assessment/Plan:      38year old female s/p R VATs pleurodesis and wedge resection POD 1     Ambulate 3-4 times per day in the halls  Spend majority of day out of bed, in chair  Encouraged cough and

## 2020-01-14 LAB
BASOPHILS # BLD AUTO: 0.08 X10(3) UL (ref 0–0.2)
BASOPHILS NFR BLD AUTO: 0.9 %
DEPRECATED RDW RBC AUTO: 41.7 FL (ref 35.1–46.3)
EOSINOPHIL # BLD AUTO: 0.41 X10(3) UL (ref 0–0.7)
EOSINOPHIL NFR BLD AUTO: 4.8 %
ERYTHROCYTE [DISTWIDTH] IN BLOOD BY AUTOMATED COUNT: 12.4 % (ref 11–15)
HCT VFR BLD AUTO: 34.6 % (ref 35–48)
HGB BLD-MCNC: 11.6 G/DL (ref 12–16)
IMM GRANULOCYTES # BLD AUTO: 0.03 X10(3) UL (ref 0–1)
IMM GRANULOCYTES NFR BLD: 0.4 %
LYMPHOCYTES # BLD AUTO: 3.45 X10(3) UL (ref 1–4)
LYMPHOCYTES NFR BLD AUTO: 40.3 %
MCH RBC QN AUTO: 30.9 PG (ref 26–34)
MCHC RBC AUTO-ENTMCNC: 33.5 G/DL (ref 31–37)
MCV RBC AUTO: 92.3 FL (ref 80–100)
MONOCYTES # BLD AUTO: 0.71 X10(3) UL (ref 0.1–1)
MONOCYTES NFR BLD AUTO: 8.3 %
NEUTROPHILS # BLD AUTO: 3.89 X10 (3) UL (ref 1.5–7.7)
NEUTROPHILS # BLD AUTO: 3.89 X10(3) UL (ref 1.5–7.7)
NEUTROPHILS NFR BLD AUTO: 45.3 %
PLATELET # BLD AUTO: 438 10(3)UL (ref 150–450)
RBC # BLD AUTO: 3.75 X10(6)UL (ref 3.8–5.3)
WBC # BLD AUTO: 8.6 X10(3) UL (ref 4–11)

## 2020-01-14 PROCEDURE — 99232 SBSQ HOSP IP/OBS MODERATE 35: CPT | Performed by: INTERNAL MEDICINE

## 2020-01-14 PROCEDURE — 99232 SBSQ HOSP IP/OBS MODERATE 35: CPT | Performed by: HOSPITALIST

## 2020-01-14 RX ORDER — OXYCODONE HYDROCHLORIDE 5 MG/1
5 TABLET ORAL EVERY 4 HOURS PRN
Qty: 40 TABLET | Refills: 0 | Status: SHIPPED | OUTPATIENT
Start: 2020-01-14 | End: 2020-01-22

## 2020-01-14 NOTE — PROGRESS NOTES
Thoracic Surgery Progress Note     Jay Castillo is a 38year old female. MRN [id number]. Admitted 1/10/2020    [address] [facility] EVENTS:     No acute events overnight. Pain more controlled today. Mccolud removed and urinating well on own.       Objective: removed. In the meantime she can be transferred to floor.     Suzie Bose PA-C  Thoracic Surgery  Pager: [phone]

## 2020-01-14 NOTE — PAYOR COMM NOTE
--------------  CONTINUED STAY REVIEW    Payor: [facility] PP  Subscriber #:  [id number]  Authorization Number: [id number]    Admit date: 1/10/20  Admit time: 2040    Admitting Physician: Leopold Mcalpine, MD  Attending Physician:  Sara Jones MD  Prim

## 2020-01-14 NOTE — PLAN OF CARE
Problem: PAIN - ADULT  Goal: Verbalizes/displays adequate comfort level or patient's stated pain goal  Description  INTERVENTIONS:  - Encourage pt to monitor pain and request assistance  - Assess pain using appropriate pain scale  - Administer analgesics saturation or ABGs  - Provide Smoking Cessation handout, if applicable  - Encourage broncho-pulmonary hygiene including cough, deep breathe, Incentive Spirometry  - Assess the need for suctioning and perform as needed  - Assess and instruct to report SOB o urine output, blood pressure (other measures as available)  - Encourage oral intake as appropriate  - Instruct patient on fluid and nutrition restrictions as appropriate  Outcome: Progressing     Problem: SKIN/TISSUE INTEGRITY - ADULT  Goal: Incision(s), w

## 2020-01-14 NOTE — PLAN OF CARE
POD #2, pt AOx4, ambulating in navarrete and up in chair most of the day. On room air. Chest tube drainage serosanguinous as charted, CT removed this afternoon by [employer] PA. Pain control improved with oxycodone. Minimal nausea today.  Appetite good, + gas and bel pain and request assistance  - Assess pain using appropriate pain scale  - Administer analgesics based on type and severity of pain and evaluate response  - Implement non-pharmacological measures as appropriate and evaluate response  - Consider cultural an preferences of patient/family/discharge partner  - Complete POLST form as appropriate  - Assess patient's ability to be responsible for managing their own health  - Refer to Case Management Department for coordinating discharge planning if the patient need and assess patient for signs and symptoms of electrolyte imbalances  - Administer electrolyte replacement as ordered  - Monitor response to electrolyte replacements, including rhythm and repeat lab results as appropriate  - Fluid restriction as ordered  -

## 2020-01-14 NOTE — PROGRESS NOTES
[facility]    Progress Note    Selestino Merchant Patient Status:  Inpatient    1975 MRN [id number]   Location [facility] 2W/SW Attending Jersey Demarco MD   Hosp Day # 4 PCP Pao Musa MD        Subjective:     Constit (L) 07/28/2019    ALT 16 07/28/2019    INR 1.00 01/12/2020    TSH 1.620 07/28/2019    HARPREET 51 03/09/2018    LIP 20 (L) 03/09/2018    MG 2.1 12/29/2019       Xr Chest Ap Portable  (cpt=71045)    Result Date: 1/13/2020  CONCLUSION: Interval removal of previou

## 2020-01-14 NOTE — PROGRESS NOTES
[facility]    Progress Note    Ceci Aguilera Patient Status:  Inpatient    1975 MRN [id number]   Location [facility] 5SW/SE Attending Jhon Mendez MD   Hosp Day # 4 PCP Hugh Butcher MD       Subjective:   Melecio Media PRN  •  Normal Saline Flush 0.9 % injection 3 mL, 3 mL, Intravenous, PRN  •  morphINE sulfate (PF) 2 MG/ML injection 2 mg, 2 mg, Intravenous, Q2H PRN **OR** morphINE sulfate (PF) 4 MG/ML injection 4 mg, 4 mg, Intravenous, Q2H PRN **OR** morphINE sulfate (P CT is removed    Results:     Recent Labs   Lab 01/12/20  0530 01/13/20  0444 01/14/20  0452   RBC 4.33 4.12 3.75*   HGB 13.4 12.4 11.6*   HCT 39.8 37.7 34.6*   MCV 91.9 91.5 92.3   MCH 30.9 30.1 30.9   MCHC 33.7 32.9 33.5   RDW 12.2 12.0 12.4   NEPRELIM 2

## 2020-01-15 ENCOUNTER — APPOINTMENT (OUTPATIENT)
Dept: GENERAL RADIOLOGY | Facility: HOSPITAL | Age: 45
DRG: 165 | End: 2020-01-15
Attending: INTERNAL MEDICINE
Payer: COMMERCIAL

## 2020-01-15 PROCEDURE — 99233 SBSQ HOSP IP/OBS HIGH 50: CPT | Performed by: NURSE PRACTITIONER

## 2020-01-15 PROCEDURE — 71046 X-RAY EXAM CHEST 2 VIEWS: CPT | Performed by: INTERNAL MEDICINE

## 2020-01-15 PROCEDURE — 99232 SBSQ HOSP IP/OBS MODERATE 35: CPT | Performed by: INTERNAL MEDICINE

## 2020-01-15 NOTE — PLAN OF CARE
Patient transferred to 4th floor this evening from unit. Pain managed with PRN Oxy and scheduled tylenol. Voiding WNL, on menstrual. General diet tolerated. Ambulating self within room. Tele in place no calls. Saline locked. Surgical incision CDI.  Aware of cultural and social influences on pain and pain management  - Manage/alleviate anxiety  - Utilize distraction and/or relaxation techniques  - Monitor for opioid side effects  - Notify MD/LIP if interventions unsuccessful or patient reports new pain  - Anti patient needs post-hospital services based on physician/LIP order or complex needs related to functional status, cognitive ability or social support system  Outcome: Progressing     Problem: RESPIRATORY - ADULT  Goal: Achieves optimal ventilation and oxyge as ordered  - Instruct patient on fluid and nutrition restrictions as appropriate  Outcome: Progressing  Goal: Hemodynamic stability and optimal renal function maintained  Description  INTERVENTIONS:  - Monitor labs and assess for signs and symptoms of vol

## 2020-01-15 NOTE — PLAN OF CARE
Double RN skin check done prior to transfer off Unit. Skin check performed by this RN and Ebony RN    Wounds are as follows:  R chest tube incision site, vats incision site. Skin intact    Will remain available for any further questions or concerns.

## 2020-01-15 NOTE — PROGRESS NOTES
[facility]    Progress Note    Bettyurvance Love Patient Status:  Inpatient    1975 MRN [id number]   Location [facility] 5SW/SE Attending Curtis Bernal MD   Hosp Day # 5 PCP Mattie Serna MD       Subjective:   Radha Cheung MG/5ML liquid 5 mL, 5 mL, Oral, BID PRN  •  Normal Saline Flush 0.9 % injection 3 mL, 3 mL, Intravenous, PRN  •  morphINE sulfate (PF) 2 MG/ML injection 2 mg, 2 mg, Intravenous, Q2H PRN **OR** morphINE sulfate (PF) 4 MG/ML injection 4 mg, 4 mg, Intravenous immobility related  -orr re-inserted, will do void trial when pain control improves and pt is more mobile  - now voiding freely     DVT Prophylaxis: heparin sq  CODE status: Full.   Dispo: pending, work on pain control.  Likely d/c tomorrow     Results:

## 2020-01-15 NOTE — PROGRESS NOTES
Thoracic Surgery Progress Note     Tee Ornelas is a 38year old female. MRN [id number]. Admitted 1/10/2020    [address] [facility] EVENTS:     CXR this AM.   Plans to stay another day for pain control. IV morphine if oral analgesics maxed.   Limited activity

## 2020-01-15 NOTE — PAYOR COMM NOTE
--------------  CONTINUED STAY REVIEW    Payor: [employer]  Subscriber #:  [id number]  Authorization Number: [id number]    Admit date: 1/10/20  Admit time: 2040    Admitting Physician: Tye Corona MD  Attending Physician:  Tiffanie Smith MD  [facility]

## 2020-01-15 NOTE — PROGRESS NOTES
[facility] - [facility]    Progress Note    Kat Briones Patient Status:  Inpatient    1975 MRN [id number]   Location [facility] 4W/SW/SE Attending Cheryl Shoemaker MD   Hosp Day # 5 PCP Roselia Figueroa MD        Subjective:     Marti Lovelace 07/28/2019    HARPREET 51 03/09/2018    LIP 20 (L) 03/09/2018    MG 2.1 12/29/2019                        Ruby Tillman.  Lashell Murphy MD  1/15/2020

## 2020-01-15 NOTE — PLAN OF CARE
Pt alert and oriented x4. VSS. On room air. Tolerating diet, no nausea. Voiding in toilet. Surgical sites C/D/I. Pain controlled with oxy IR and scheduled tylenol. Fall precautions in place. Repeat Xray done today.  Will likely go home tomorrow pending pain influences on pain and pain management  - Manage/alleviate anxiety  - Utilize distraction and/or relaxation techniques  - Monitor for opioid side effects  - Notify MD/LIP if interventions unsuccessful or patient reports new pain  - Anticipate increased [place] post-hospital services based on physician/LIP order or complex needs related to functional status, cognitive ability or social support system  Outcome: Progressing     Problem: RESPIRATORY - ADULT  Goal: Achieves optimal ventilation and oxygenation  Descri Instruct patient on fluid and nutrition restrictions as appropriate  Outcome: Progressing  Goal: Hemodynamic stability and optimal renal function maintained  Description  INTERVENTIONS:  - Monitor labs and assess for signs and symptoms of volume excess or

## 2020-01-16 VITALS
SYSTOLIC BLOOD PRESSURE: 102 MMHG | OXYGEN SATURATION: 95 % | DIASTOLIC BLOOD PRESSURE: 66 MMHG | BODY MASS INDEX: 26.03 KG/M2 | WEIGHT: 165.88 LBS | RESPIRATION RATE: 18 BRPM | HEART RATE: 81 BPM | TEMPERATURE: 97 F | HEIGHT: 67 IN

## 2020-01-16 PROCEDURE — 99232 SBSQ HOSP IP/OBS MODERATE 35: CPT | Performed by: INTERNAL MEDICINE

## 2020-01-16 PROCEDURE — 99239 HOSP IP/OBS DSCHRG MGMT >30: CPT | Performed by: NURSE PRACTITIONER

## 2020-01-16 RX ORDER — NICOTINE 21 MG/24HR
1 PATCH, TRANSDERMAL 24 HOURS TRANSDERMAL DAILY
Qty: 30 PATCH | Refills: 0 | Status: SHIPPED | OUTPATIENT
Start: 2020-01-16 | End: 2021-03-02

## 2020-01-16 RX ORDER — NICOTINE 21 MG/24HR
1 PATCH, TRANSDERMAL 24 HOURS TRANSDERMAL DAILY
Qty: 30 PATCH | Refills: 0 | Status: SHIPPED | OUTPATIENT
Start: 2020-01-16 | End: 2020-01-16

## 2020-01-16 NOTE — PROGRESS NOTES
[facility] - [facility]     Progress Note        Irena Briones Patient Status:  Inpatient    1975 MRN [id number]   Location [facility] 4W/SW/SE Attending Adali Green MD   Hosp Day # 6 PCP Spencer Goodpasture, MD       Subjective:   Rosalino Talavera HCl (REGLAN) injection 10 mg, 10 mg, Intravenous, Q8H PRN  Prochlorperazine Edisylate (COMPAZINE) injection 5 mg, 5 mg, Intravenous, Q8H PRN  Heparin Sodium (Porcine) 5000 UNIT/ML injection 5,000 Units, 5,000 Units, Subcutaneous, 2 times per day  diphenhyd

## 2020-01-16 NOTE — PROGRESS NOTES
Thoracic Surgery Progress Note     Audie Aleman is a 38year old female. MRN [id number]. Admitted 1/10/2020    [facility] EVENTS:       Doing well. Pain improving.  Worse at incisions with movement  Controlled with pills  No other complaints    Obje

## 2020-01-16 NOTE — PAYOR COMM NOTE
--------------  CONTINUED STAY REVIEW    Payor: [facility]  Subscriber #:  [id number]  Authorization Number: [id number]    Admit date: 1/10/20  Admit time: 2040    Admitting Physician:   Attending Physician:  Latonya Blackmon MD  Primary Care Physician:

## 2020-01-17 ENCOUNTER — PATIENT OUTREACH (OUTPATIENT)
Dept: CASE MANAGEMENT | Age: 45
End: 2020-01-17

## 2020-01-17 NOTE — DISCHARGE SUMMARY
[facility] - [facility]    Discharge Summary    Adan Briones Patient Status:  Inpatient    1975 MRN [id number]   Location [facility] 4W/SW/SE Attending No att. providers found   [facility] Day # 6 PCP Jayme Woodson MD     Date of Admission: 1 presented to 's office for follow-up. She has noted cough at home. Overall she is comfortable.   On exam she was noted to have absent breath sounds on the right, follow-up chest x-ray that was done noted a large recurrent right sided spontaneous Sleep Disorders  Contact information:  [address] [phone]                     Discharge Condition: Stable    Discharge Medications:      Discharge Medications      START taking these medications      Instructions Prescri 4 hours of taking prescription pain medications). You may shower. Do range of motion exercises twice a day with the arm on the side of your operation. The easiest is to [facility]" your hand up the wall with your fingers.  Eventually you should be able to g

## 2020-01-17 NOTE — PROGRESS NOTES
NCM attempted to contact patient for hospital follow up. Unable to leave message as mailbox is full.

## 2020-01-22 RX ORDER — OXYCODONE HYDROCHLORIDE 5 MG/1
5 TABLET ORAL EVERY 4 HOURS PRN
Qty: 30 TABLET | Refills: 0 | Status: SHIPPED | OUTPATIENT
Start: 2020-01-22 | End: 2021-03-02

## 2020-01-22 NOTE — PAYOR COMM NOTE
--------------  DISCHARGE REVIEW    Payor: [employer]  Subscriber #:  [id number]  Authorization Number: [id number]    Admit date: 1/10/20  Admit time:  2040  Discharge Date: 1/16/2020  3:18 PM     Admitting Physician:   Attending Physician:  Savanah Barreto normal respiratory effort  CV: Heart with regular rate and rhythm  Abd: Abdomen soft, nontender, nondistended, bowel sounds present  Neuro: No acute focal deficits  MSK: Full range of motion in extremities  Skin: Warm and dry  Psych: Normal affect, anxious retention  -likely narcotic and immobility related  -orr re-inserted, will do void trial when pain control improves and pt is more mobile  - now voiding freely       Vitals: Blood pressure 102/66, pulse 81, temperature 97 °F (36.1 °C), temperature source Bring a paper prescription for each of these medications  · lidocaine-menthol 4-1 % Ptch  · nicotine 14 MG/24HR Pt24  · oxyCODONE HCl 5 MG Tabs             Discharge Instructions       You have a follow up appointment with Dr. Azeem Crowder at [facility] in [place] regarding a follow up appointment if needed.     Call the Office ([phone]) for any of the following:    - Persistent fevers of 101.5 or greater  - Worsening pain  - Worsening shortness of breath  - Signs of infection in your wound such as drainage, wor

## 2020-08-21 ENCOUNTER — TELEPHONE (OUTPATIENT)
Dept: FAMILY MEDICINE CLINIC | Facility: CLINIC | Age: 45
End: 2020-08-21

## 2020-08-21 ENCOUNTER — TELEMEDICINE (OUTPATIENT)
Dept: FAMILY MEDICINE CLINIC | Facility: CLINIC | Age: 45
End: 2020-08-21

## 2020-08-21 DIAGNOSIS — K52.9 GASTROENTERITIS: Primary | ICD-10-CM

## 2020-08-21 PROCEDURE — 99213 OFFICE O/P EST LOW 20 MIN: CPT | Performed by: PHYSICIAN ASSISTANT

## 2020-08-21 NOTE — TELEPHONE ENCOUNTER
Patient is requesting note clearing to return to work due to [place] concern by employer. Reports was sick on Tue night into Wed, had stomachache, nausea, vomiting, reports due to shrimp she had on Tue.  Yesterday had some nausea only, today no symptoms, has

## 2020-08-21 NOTE — PROGRESS NOTES
Please note that the following visit was completed using two-way, real-time interactive audio and/or video communication.   This has been done in good bruce to provide continuity of care in the best interest of the provider-patient relationship, due to the made aware of where to find [facility] notice of privacy practices, telehealth consent form and other related consent forms and documents. which are located on the [facility] website.  The patient verbally agreed to telehealth consent form, related consents and the ri the skin daily. (Patient not taking: Reported on 8/21/2020 ) 30 patch 0   • HYDROcodone-acetaminophen 5-325 MG Oral Tab Take 1 tablet by mouth every 6 (six) hours as needed.  (Patient not taking: Reported on 8/21/2020 ) 20 tablet 0   • Hydrocod Polst-CPM Po coherent and alert on the phone. Alert and oriented x 3  Patient was responding to questions appropriately. Patient did not appear short of breath. ASSESSMENT/PLAN:   Gastroenteritis  (primary encounter diagnosis)    1.  Gastroenteritis  -After discuss

## 2020-09-15 ENCOUNTER — NURSE TRIAGE (OUTPATIENT)
Dept: FAMILY MEDICINE CLINIC | Facility: CLINIC | Age: 45
End: 2020-09-15

## 2020-09-15 ENCOUNTER — LAB ENCOUNTER (OUTPATIENT)
Dept: LAB | Facility: REFERENCE LAB | Age: 45
End: 2020-09-15
Attending: FAMILY MEDICINE
Payer: COMMERCIAL

## 2020-09-15 DIAGNOSIS — N39.0 URINARY TRACT INFECTION WITHOUT HEMATURIA, SITE UNSPECIFIED: ICD-10-CM

## 2020-09-15 DIAGNOSIS — N39.0 URINARY TRACT INFECTION WITHOUT HEMATURIA, SITE UNSPECIFIED: Primary | ICD-10-CM

## 2020-09-15 LAB
BILIRUB UR QL: NEGATIVE
COLOR UR: YELLOW
GLUCOSE UR-MCNC: NEGATIVE MG/DL
HGB UR QL STRIP.AUTO: NEGATIVE
KETONES UR-MCNC: NEGATIVE MG/DL
NITRITE UR QL STRIP.AUTO: NEGATIVE
PH UR: 5 [PH] (ref 5–8)
PROT UR-MCNC: 30 MG/DL
RBC #/AREA URNS AUTO: 1 /HPF
SP GR UR STRIP: 1.03 (ref 1–1.03)
UROBILINOGEN UR STRIP-ACNC: 2
WBC #/AREA URNS AUTO: 15 /HPF

## 2020-09-15 PROCEDURE — 81001 URINALYSIS AUTO W/SCOPE: CPT

## 2020-09-15 PROCEDURE — 87086 URINE CULTURE/COLONY COUNT: CPT

## 2020-09-15 NOTE — TELEPHONE ENCOUNTER
Action Requested: Summary for Provider     []  Critical Lab, Recommendations Needed  [] Need Additional Advice  []   FYI    []   Need Orders  [] Need Medications Sent to Pharmacy  []  Other     SUMMARY: Patient requests order for UA and urine culture for U

## 2020-09-15 NOTE — TELEPHONE ENCOUNTER
Left message to call back for Dr. Arminda Villegas recommendations below--sent [employer] message of same

## 2020-09-16 ENCOUNTER — TELEPHONE (OUTPATIENT)
Dept: GASTROENTEROLOGY | Facility: CLINIC | Age: 45
End: 2020-09-16

## 2020-09-16 NOTE — TELEPHONE ENCOUNTER
Victor M Green CMA  [facility] Clinical Staff             Recall colon in 3 years per MG.  Colon done 11-16-17

## 2020-09-28 ENCOUNTER — TELEPHONE (OUTPATIENT)
Dept: FAMILY MEDICINE CLINIC | Facility: CLINIC | Age: 45
End: 2020-09-28

## 2020-09-28 ENCOUNTER — TELEPHONE (OUTPATIENT)
Dept: GASTROENTEROLOGY | Facility: CLINIC | Age: 45
End: 2020-09-28

## 2020-09-28 DIAGNOSIS — Z12.31 SCREENING MAMMOGRAM, ENCOUNTER FOR: Primary | ICD-10-CM

## 2020-09-28 DIAGNOSIS — Z86.010 PERSONAL HISTORY OF COLONIC POLYPS: Primary | ICD-10-CM

## 2020-09-28 NOTE — TELEPHONE ENCOUNTER
Pt calling to schedule 3 year repeat CLN. Pt states she is having blood in stool and rectal bleeding. Pt received recall letter.  Please call [phone]

## 2020-09-28 NOTE — TELEPHONE ENCOUNTER
Last Procedure, Date, MD: MEHRAN with Dr. Kulwant Aguilar on 11/16/2017  Last Diagnosis:Post-operative diagnosis: colon polyps, diverticulosis, hemorrhoids    Recalled for (mth/yrs): 3yrs-due 11/16/2020  Sedation used previously:  Versed 10 mg IV push.  Fentanyl 100 mcg I

## 2020-09-29 NOTE — TELEPHONE ENCOUNTER
GI staff:    Please schedule for colonoscopy with MAC with split trilyte at [facility] or the hospital for history of adenomatous colon polyps    Thanks    Genet Gordon MD  [address] [facility] [place] - Gastroenterology  9/29/2020  8:55 AM

## 2020-10-02 RX ORDER — POLYETHYLENE GLYCOL 3350, SODIUM CHLORIDE, SODIUM BICARBONATE, POTASSIUM CHLORIDE 420; 11.2; 5.72; 1.48 G/4L; G/4L; G/4L; G/4L
POWDER, FOR SOLUTION ORAL
Qty: 1 BOTTLE | Refills: 0 | Status: ON HOLD | OUTPATIENT
Start: 2020-10-02 | End: 2020-12-18

## 2020-10-02 NOTE — TELEPHONE ENCOUNTER
Sent.    Thanks!     Jenae Soto MD  [facility] - Gastroenterology  10/2/2020  1:51 PM

## 2020-10-13 ENCOUNTER — HOSPITAL ENCOUNTER (OUTPATIENT)
Dept: MAMMOGRAPHY | Age: 45
Discharge: HOME | End: 2020-10-13
Attending: FAMILY MEDICINE
Payer: COMMERCIAL

## 2020-10-13 DIAGNOSIS — Z12.31 SCREENING MAMMOGRAM, ENCOUNTER FOR: ICD-10-CM

## 2020-10-13 PROCEDURE — 77063 BREAST TOMOSYNTHESIS BI: CPT | Performed by: FAMILY MEDICINE

## 2020-10-13 PROCEDURE — 77067 SCR MAMMO BI INCL CAD: CPT | Performed by: FAMILY MEDICINE

## 2020-12-12 RX ORDER — MULTIVIT WITH CALCIUM,IRON,MIN
1 TABLET ORAL DAILY
COMMUNITY

## 2020-12-12 RX ORDER — PYRIDOXINE HCL (VITAMIN B6) 100 MG
TABLET ORAL
COMMUNITY

## 2020-12-15 ENCOUNTER — TELEPHONE (OUTPATIENT)
Dept: GASTROENTEROLOGY | Facility: CLINIC | Age: 45
End: 2020-12-15

## 2020-12-15 ENCOUNTER — LAB ENCOUNTER (OUTPATIENT)
Dept: LAB | Facility: HOSPITAL | Age: 45
End: 2020-12-15
Attending: INTERNAL MEDICINE
Payer: COMMERCIAL

## 2020-12-15 DIAGNOSIS — Z01.818 PRE-OP TESTING: ICD-10-CM

## 2020-12-15 NOTE — TELEPHONE ENCOUNTER
I spoke with this patient regarding the questions she had with how she is to drink her prep. We went over the time of the first and second dose and to  some Gas-x pills which she will do that today.  She verbally understood all information and will c

## 2020-12-18 ENCOUNTER — HOSPITAL ENCOUNTER (OUTPATIENT)
Facility: HOSPITAL | Age: 45
Setting detail: HOSPITAL OUTPATIENT SURGERY
Discharge: HOME | End: 2020-12-18
Attending: INTERNAL MEDICINE | Admitting: INTERNAL MEDICINE
Payer: COMMERCIAL

## 2020-12-18 ENCOUNTER — ANESTHESIA EVENT (OUTPATIENT)
Dept: ENDOSCOPY | Facility: HOSPITAL | Age: 45
End: 2020-12-18
Payer: COMMERCIAL

## 2020-12-18 ENCOUNTER — ANESTHESIA (OUTPATIENT)
Dept: ENDOSCOPY | Facility: HOSPITAL | Age: 45
End: 2020-12-18
Payer: COMMERCIAL

## 2020-12-18 VITALS
SYSTOLIC BLOOD PRESSURE: 114 MMHG | HEART RATE: 66 BPM | HEIGHT: 67 IN | WEIGHT: 150 LBS | OXYGEN SATURATION: 98 % | TEMPERATURE: 98 F | RESPIRATION RATE: 20 BRPM | DIASTOLIC BLOOD PRESSURE: 98 MMHG | BODY MASS INDEX: 23.54 KG/M2

## 2020-12-18 DIAGNOSIS — Z86.010 PERSONAL HISTORY OF COLONIC POLYPS: ICD-10-CM

## 2020-12-18 DIAGNOSIS — Z01.818 PRE-OP TESTING: Primary | ICD-10-CM

## 2020-12-18 PROCEDURE — 0DBK8ZX EXCISION OF ASCENDING COLON, VIA NATURAL OR ARTIFICIAL OPENING ENDOSCOPIC, DIAGNOSTIC: ICD-10-PCS | Performed by: INTERNAL MEDICINE

## 2020-12-18 PROCEDURE — 45385 COLONOSCOPY W/LESION REMOVAL: CPT | Performed by: INTERNAL MEDICINE

## 2020-12-18 PROCEDURE — 0DBN8ZX EXCISION OF SIGMOID COLON, VIA NATURAL OR ARTIFICIAL OPENING ENDOSCOPIC, DIAGNOSTIC: ICD-10-PCS | Performed by: INTERNAL MEDICINE

## 2020-12-18 PROCEDURE — 45380 COLONOSCOPY AND BIOPSY: CPT | Performed by: INTERNAL MEDICINE

## 2020-12-18 RX ORDER — SODIUM CHLORIDE, SODIUM LACTATE, POTASSIUM CHLORIDE, CALCIUM CHLORIDE 600; 310; 30; 20 MG/100ML; MG/100ML; MG/100ML; MG/100ML
INJECTION, SOLUTION INTRAVENOUS CONTINUOUS
Status: DISCONTINUED | OUTPATIENT
Start: 2020-12-18 | End: 2020-12-18

## 2020-12-18 RX ORDER — NALOXONE HYDROCHLORIDE 0.4 MG/ML
80 INJECTION, SOLUTION INTRAMUSCULAR; INTRAVENOUS; SUBCUTANEOUS AS NEEDED
Status: CANCELLED | OUTPATIENT
Start: 2020-12-18 | End: 2020-12-18

## 2020-12-18 RX ORDER — SODIUM CHLORIDE, SODIUM LACTATE, POTASSIUM CHLORIDE, CALCIUM CHLORIDE 600; 310; 30; 20 MG/100ML; MG/100ML; MG/100ML; MG/100ML
INJECTION, SOLUTION INTRAVENOUS CONTINUOUS
Status: CANCELLED | OUTPATIENT
Start: 2020-12-18

## 2020-12-18 RX ORDER — LIDOCAINE HYDROCHLORIDE 10 MG/ML
INJECTION, SOLUTION EPIDURAL; INFILTRATION; INTRACAUDAL; PERINEURAL AS NEEDED
Status: DISCONTINUED | OUTPATIENT
Start: 2020-12-18 | End: 2020-12-18 | Stop reason: SURG

## 2020-12-18 RX ADMIN — LIDOCAINE HYDROCHLORIDE 50 MG: 10 INJECTION, SOLUTION EPIDURAL; INFILTRATION; INTRACAUDAL; PERINEURAL at 09:06:00

## 2020-12-18 RX ADMIN — SODIUM CHLORIDE, SODIUM LACTATE, POTASSIUM CHLORIDE, CALCIUM CHLORIDE: 600; 310; 30; 20 INJECTION, SOLUTION INTRAVENOUS at 09:00:00

## 2020-12-18 RX ADMIN — SODIUM CHLORIDE, SODIUM LACTATE, POTASSIUM CHLORIDE, CALCIUM CHLORIDE: 600; 310; 30; 20 INJECTION, SOLUTION INTRAVENOUS at 09:30:00

## 2020-12-18 NOTE — H&P
History & Physical Examination    Patient Name: Ceci Aguilera  MRN: [id number]  [facility]: [id number]  YOB: 1975    Diagnosis: history of adenomatous colon polyps, rectal bleeding      •  Multiple Vitamins-Minerals (MULTIPLE VITAMINS/WOMENS) Oral T 2013     Past Surgical History:   Procedure Laterality Date   • COLONOSCOPY N/A 11/16/2017    Performed by Scott Ivy MD at [facility] ENDOSCOPY   • CYST ASPIRATION RIGHT  2018    negative   • CYSTOSCOPY N/A 3/10/2017    Performed by Marely Zimmer MD a

## 2020-12-18 NOTE — ANESTHESIA PREPROCEDURE EVALUATION
Anesthesia PreOp Note    HPI:     Tee Ornelas is a 54year old female who presents for preoperative consultation requested by: David Hernandez MD    Date of Surgery: 12/18/2020    Procedure(s):  COLONOSCOPY  Indication: Personal history of colonic po ENDOSCOPY   • CYST ASPIRATION RIGHT  2018    negative   • CYSTOSCOPY N/A 3/10/2017    Performed by Donnetta Burkitt, MD at [address]   •  [facility]  2013, 2017   • D & C  1994   • HC PUNCTURE ASPIRATION BREAST CYST Right 2018   • HC PUNCTURE ASP HIVES  Bactrim Ds [Sulfame*    HIVES, ITCHING  Tramadol                HIVES, ITCHING    Family History   Problem Relation Age of Onset   • Breast Cancer Mother 46        cause of death; d.56   • Cancer Maternal Uncle 56        tongue cancer-ca partner: Not on file        Emotionally abused: Not on file        Physically abused: Not on file        Forced sexual activity: Not on file    Other Topics      Concerns:         Service: Not Asked        Blood Transfusions: Not Asked        Caffe and/or legal guardian or family member of the nature of the anesthetic plan, benefits, risks including possible dental damage if relevant, major complications, and any alternative forms of anesthetic management.    All of the patient's questions were answer

## 2020-12-18 NOTE — ANESTHESIA POSTPROCEDURE EVALUATION
Patient: Ester Merchant    Procedure Summary     Date: 12/18/20 Room / Location: [address] [place] [address] ENDOSCOPY 05 / [address] [place] [address] ENDOSCOPY    Anesthesia Start: 0900 Anesthesia Stop: 5114    Procedure: COLONOSCOPY (N/A ) Diagnosis:       Personal history of colonic polyps      (poly

## 2020-12-18 NOTE — OPERATIVE REPORT
Colonoscopy Report    Chantal Jaramillo     1975 Age 54year old   PCP Cholo Hearn MD Endoscopist Susan Ling MD     Date of procedure: 20    Procedure: Colonoscopy w/ biopsy and snare polypectomy    Pre-operative diagnosis: history of postoperative complications. The patient’s vital signs were monitored throughout the procedure and remained stable.     Estimated blood loss: insignificant    Specimens collected:  Colon polyps    Complications: none     Colonoscopy findings:  Terminal ileu Juan Carlos Martinez, [address] [place] [address] - Gastroenterology  12/18/2020

## 2020-12-21 ENCOUNTER — TELEPHONE (OUTPATIENT)
Dept: GASTROENTEROLOGY | Facility: CLINIC | Age: 45
End: 2020-12-21

## 2020-12-21 DIAGNOSIS — K63.9 SUBMUCOSAL COLONIC LESION: Primary | ICD-10-CM

## 2020-12-21 NOTE — TELEPHONE ENCOUNTER
Called patient. No answer. Sent message through my chart.     GI staff:     please place recall for colonoscopy in 5 years     Please call patient to schedule flexible sigmoidoscopy with endoscopic ultrasound for subepithelial lesion of the colon at the

## 2020-12-21 NOTE — TELEPHONE ENCOUNTER
Viewed by Jalen Richards on 12/21/2020 12:33 PM  Written by Sincere Zapien MD on 12/21/2020 12:28 PM  Hi [email or web address],     I tried calling but it went straight to voice mail. The polyps we removed from the colon were hyperplastic polyps which are benign.  I wou

## 2020-12-21 NOTE — TELEPHONE ENCOUNTER
Patient has viewed test results and Dr. Regino Reina recommendations on [employer]. Updated health maintenance  5 year colonoscopy recall placed via patient outreach. GI schedulers please call the patient to schedule procedure as per below. Thanks.

## 2020-12-22 NOTE — TELEPHONE ENCOUNTER
Scheduled for:  Flex Sigmoidoscopy 95012 and EUS w/poss FNA 60500  Provider Name:  Dr. Missy Diaz  Date:  2/8/21  Location:   [facility]  Sedation:  MAC  Time:  9991 (pt is aware to arrive at 1430)   Prep:  X 2 Fleets enema, 1st admin at 1200 and 2nd admin at 1300

## 2021-01-04 ENCOUNTER — NURSE TRIAGE (OUTPATIENT)
Dept: FAMILY MEDICINE CLINIC | Facility: CLINIC | Age: 46
End: 2021-01-04

## 2021-01-04 RX ORDER — NITROFURANTOIN 25; 75 MG/1; MG/1
100 CAPSULE ORAL 2 TIMES DAILY
Qty: 14 CAPSULE | Refills: 0 | Status: SHIPPED | OUTPATIENT
Start: 2021-01-04 | End: 2021-03-02

## 2021-01-04 RX ORDER — NITROFURANTOIN 25; 75 MG/1; MG/1
100 CAPSULE ORAL 2 TIMES DAILY
Qty: 14 CAPSULE | Refills: 0 | OUTPATIENT
Start: 2021-01-04

## 2021-01-04 NOTE — TELEPHONE ENCOUNTER
Patient informed of provider's response/rx sent as per below, and voiced understating and agrees with all.

## 2021-01-22 ENCOUNTER — TELEPHONE (OUTPATIENT)
Dept: GASTROENTEROLOGY | Facility: CLINIC | Age: 46
End: 2021-01-22

## 2021-01-22 DIAGNOSIS — K63.9 LESION OF COLON: Primary | ICD-10-CM

## 2021-01-26 ENCOUNTER — TELEPHONE (OUTPATIENT)
Dept: GASTROENTEROLOGY | Facility: CLINIC | Age: 46
End: 2021-01-26

## 2021-01-26 NOTE — TELEPHONE ENCOUNTER
Carolina Hodges states that there is casey open referral [id number]  And she has been told by [place] [facility]  you need to enter the CPT's , provide clinical and change from open to pending. . Please call

## 2021-02-01 NOTE — TELEPHONE ENCOUNTER
Referral was marked as no PA needed by the [employer] rep in error. I have submitted an attempt at a retro authorization online via [employer]. Case # [id number] is pending review.      Called and LVM for Pat from hospital accounts know that I have submitt

## 2021-02-02 NOTE — TELEPHONE ENCOUNTER
Patient states she needs to cancel her procedure on 02/08/2021 and reschedule. Flex sig cancelled in Epic. Surgical case request sent to Endo. GI schedulers please assist patient in rescheduling.  Thank you

## 2021-02-02 NOTE — TELEPHONE ENCOUNTER
Patient contacted and advised referral is still pending per [employer]. Patient voiced understanding.

## 2021-02-05 NOTE — TELEPHONE ENCOUNTER
Scheduled for:  Flex Sigmoidoscopy 35941 and EUS w/poss FNA 12952  Provider Name:  Dr. Jcarlos Brooks  Date:  3/1/21  Location:   [facility]  Sedation:  MAC  Time:  4553 (pt is aware to arrive at 1330)   Prep:  X 2 Fleets enema, 1st admin at 1100 and 2nd admin at 1200

## 2021-02-22 NOTE — TELEPHONE ENCOUNTER
My case has been voided on [employer]. Called [employer] at [phone] and requested a retro referral. Per their automated system no PA is required. Call reference # [id number]. [facility] no longer processes referrals for [employer].  [facility]

## 2021-02-26 ENCOUNTER — LAB ENCOUNTER (OUTPATIENT)
Dept: LAB | Facility: HOSPITAL | Age: 46
End: 2021-02-26
Attending: INTERNAL MEDICINE
Payer: COMMERCIAL

## 2021-02-26 DIAGNOSIS — Z01.818 PRE-OP TESTING: ICD-10-CM

## 2021-02-27 LAB — SARS-COV-2 RNA RESP QL NAA+PROBE: NOT DETECTED

## 2021-03-01 ENCOUNTER — ANESTHESIA EVENT (OUTPATIENT)
Dept: ENDOSCOPY | Facility: HOSPITAL | Age: 46
End: 2021-03-01
Payer: COMMERCIAL

## 2021-03-01 ENCOUNTER — TELEPHONE (OUTPATIENT)
Dept: GASTROENTEROLOGY | Facility: CLINIC | Age: 46
End: 2021-03-01

## 2021-03-01 ENCOUNTER — HOSPITAL ENCOUNTER (OUTPATIENT)
Facility: HOSPITAL | Age: 46
Setting detail: HOSPITAL OUTPATIENT SURGERY
Discharge: HOME | End: 2021-03-01
Attending: INTERNAL MEDICINE | Admitting: INTERNAL MEDICINE
Payer: COMMERCIAL

## 2021-03-01 ENCOUNTER — ANESTHESIA (OUTPATIENT)
Dept: ENDOSCOPY | Facility: HOSPITAL | Age: 46
End: 2021-03-01
Payer: COMMERCIAL

## 2021-03-01 VITALS
TEMPERATURE: 97 F | WEIGHT: 145 LBS | HEIGHT: 67 IN | HEART RATE: 62 BPM | BODY MASS INDEX: 22.76 KG/M2 | RESPIRATION RATE: 13 BRPM | OXYGEN SATURATION: 100 % | SYSTOLIC BLOOD PRESSURE: 122 MMHG | DIASTOLIC BLOOD PRESSURE: 69 MMHG

## 2021-03-01 DIAGNOSIS — Z01.818 PRE-OP TESTING: Primary | ICD-10-CM

## 2021-03-01 DIAGNOSIS — K63.9 LESION OF COLON: ICD-10-CM

## 2021-03-01 LAB — B-HCG UR QL: NEGATIVE

## 2021-03-01 PROCEDURE — 45342 SIGMOIDOSCOPY W/US GUIDE BX: CPT | Performed by: INTERNAL MEDICINE

## 2021-03-01 PROCEDURE — 0DBN8ZX EXCISION OF SIGMOID COLON, VIA NATURAL OR ARTIFICIAL OPENING ENDOSCOPIC, DIAGNOSTIC: ICD-10-PCS | Performed by: INTERNAL MEDICINE

## 2021-03-01 RX ORDER — LIDOCAINE HYDROCHLORIDE 10 MG/ML
INJECTION, SOLUTION EPIDURAL; INFILTRATION; INTRACAUDAL; PERINEURAL AS NEEDED
Status: DISCONTINUED | OUTPATIENT
Start: 2021-03-01 | End: 2021-03-01 | Stop reason: SURG

## 2021-03-01 RX ORDER — NALOXONE HYDROCHLORIDE 0.4 MG/ML
80 INJECTION, SOLUTION INTRAMUSCULAR; INTRAVENOUS; SUBCUTANEOUS AS NEEDED
Status: DISCONTINUED | OUTPATIENT
Start: 2021-03-01 | End: 2021-03-01

## 2021-03-01 RX ORDER — SODIUM CHLORIDE, SODIUM LACTATE, POTASSIUM CHLORIDE, CALCIUM CHLORIDE 600; 310; 30; 20 MG/100ML; MG/100ML; MG/100ML; MG/100ML
INJECTION, SOLUTION INTRAVENOUS CONTINUOUS
Status: DISCONTINUED | OUTPATIENT
Start: 2021-03-01 | End: 2021-03-01

## 2021-03-01 RX ORDER — ONDANSETRON 2 MG/ML
4 INJECTION INTRAMUSCULAR; INTRAVENOUS EVERY 6 HOURS PRN
Status: DISCONTINUED | OUTPATIENT
Start: 2021-03-01 | End: 2021-03-01

## 2021-03-01 RX ORDER — MIDAZOLAM HYDROCHLORIDE 1 MG/ML
INJECTION INTRAMUSCULAR; INTRAVENOUS AS NEEDED
Status: DISCONTINUED | OUTPATIENT
Start: 2021-03-01 | End: 2021-03-01 | Stop reason: SURG

## 2021-03-01 RX ADMIN — SODIUM CHLORIDE, SODIUM LACTATE, POTASSIUM CHLORIDE, CALCIUM CHLORIDE: 600; 310; 30; 20 INJECTION, SOLUTION INTRAVENOUS at 15:38:00

## 2021-03-01 RX ADMIN — LIDOCAINE HYDROCHLORIDE 50 MG: 10 INJECTION, SOLUTION EPIDURAL; INFILTRATION; INTRACAUDAL; PERINEURAL at 14:23:00

## 2021-03-01 RX ADMIN — MIDAZOLAM HYDROCHLORIDE 2 MG: 1 INJECTION INTRAMUSCULAR; INTRAVENOUS at 14:18:00

## 2021-03-01 NOTE — OPERATIVE REPORT
Flexible Sigmoidoscopy & Endoscopic Ultrasound (EUS) Report    Elridge Fears     1975 Age 54year old   PCP Michelle Villatoro MD Endoscopist Farrah Coles MD     Date of procedure: 21    Procedure: Flex sig w/ EUS with guided FNA    Pre-ope colon where the mucosa was only partially visualized due to retained liquid stool covering the mucosa. On withdrawal into the sigmoid colon there were a few scattered diverticula visualized.  At 22 cm from the anal verge the previously visualized subepithel

## 2021-03-01 NOTE — H&P
History & Physical Examination    Patient Name: Isabela Martinez  MRN: [id number]  [facility]: [id number]  YOB: 1975    Diagnosis: subepithelial rectal lesion/nodule      •  Multiple Vitamins-Minerals (MULTIPLE VITAMINS/WOMENS) Oral Tab, Take 1 tablet cystitis 2013   • Menstrual problem    • Monoallelic mutation of CHEK2 gene in female patient    • Pneumothorax    • Vitamin D deficiency 2013     Past Surgical History:   Procedure Laterality Date   • COLONOSCOPY  12/18/2020   • COLONOSCOPY N/A 12/18/2020 of care. I have reviewed the History and Physical done within the last 30 days. Any changes noted above.   Lakshmi Dawson MD  [facility], [facility] - Gastroenterology  3/1/2021  2:07 PM

## 2021-03-01 NOTE — TELEPHONE ENCOUNTER
Pt was at the pharmacy and wanted to know what she was supposed to buy for her prep for today's procedure    I told her to buy 2 fleets enema's     Prep:  X 2 Fleets enema, 1st admin at 1100 and 2nd admin at 1200    Pt verbalizes understanding

## 2021-03-01 NOTE — ANESTHESIA PREPROCEDURE EVALUATION
Anesthesia PreOp Note    HPI:     Jo-Ann Holguin is a 54year old female who presents for preoperative consultation requested by: Perla Gill MD    Date of Surgery: 3/1/2021    Procedure(s):   FLEXIBLE SIGMOIDOSCOPY  ENDOSCOPIC ULTRASOUND (EUS)  Christianne Pneumothorax    • Vitamin D deficiency 2013       Past Surgical History:   Procedure Laterality Date   • COLONOSCOPY  12/18/2020   • COLONOSCOPY N/A 12/18/2020    Performed by Tricia Villegas MD at [facility] ENDOSCOPY   • COLONOSCOPY N/A 11/16/2017    Perfor Hydrocod Polst-CPM Polst ER 10-8 MG/5ML Oral Suspension Extended Release, Take 5 mL by mouth 2 (two) times daily as needed (COUGH).  (Patient not taking: Reported on 8/21/2020 ), Disp: 115 mL, Rfl: 0        •  lactated ringers infusion, , Intravenous, Caity Chakraborty Minutes per session: Not on file      Stress: Not on file    Relationships      Social connections        Talks on phone: Not on file        Gets together: Not on file        Attends Congregational service: Not on file        Active member of club or organizat normal exam    Neuro/Psych - negative ROS     GI/Hepatic/Renal    (+) bowel prep    Endo/Other - negative ROS   Abdominal  - normal exam               Anesthesia Plan:   ASA:  2  Plan:   MAC  Informed Consent Plan and Risks Discussed With:  Patient      I

## 2021-03-01 NOTE — ANESTHESIA POSTPROCEDURE EVALUATION
Patient: Reinier Canales    Procedure Summary     Date: 03/01/21 Room / Location: [address] [place] [address] ENDOSCOPY 01 / [address] [facility] ENDOSCOPY    Anesthesia Start: 8585 Anesthesia Stop: 6702    Procedures:        FLEXIBLE SIGMOIDOSCOPY (N/A )      ENDOSCOPIC ULTRASOUND (EUS) (N/A ) Diag

## 2021-03-02 ENCOUNTER — HOSPITAL ENCOUNTER (OUTPATIENT)
Dept: GENERAL RADIOLOGY | Facility: HOSPITAL | Age: 46
Discharge: HOME | End: 2021-03-02
Attending: FAMILY MEDICINE
Payer: COMMERCIAL

## 2021-03-02 ENCOUNTER — TELEPHONE (OUTPATIENT)
Dept: GASTROENTEROLOGY | Facility: CLINIC | Age: 46
End: 2021-03-02

## 2021-03-02 ENCOUNTER — OFFICE VISIT (OUTPATIENT)
Dept: FAMILY MEDICINE CLINIC | Facility: CLINIC | Age: 46
End: 2021-03-02
Payer: COMMERCIAL

## 2021-03-02 VITALS
DIASTOLIC BLOOD PRESSURE: 75 MMHG | SYSTOLIC BLOOD PRESSURE: 130 MMHG | HEART RATE: 86 BPM | RESPIRATION RATE: 20 BRPM | HEIGHT: 66.4 IN | BODY MASS INDEX: 25.73 KG/M2 | WEIGHT: 162 LBS | TEMPERATURE: 97 F

## 2021-03-02 DIAGNOSIS — S99.911A INJURY OF RIGHT ANKLE, INITIAL ENCOUNTER: ICD-10-CM

## 2021-03-02 DIAGNOSIS — F41.9 ANXIETY: ICD-10-CM

## 2021-03-02 PROCEDURE — 3075F SYST BP GE 130 - 139MM HG: CPT | Performed by: FAMILY MEDICINE

## 2021-03-02 PROCEDURE — 3008F BODY MASS INDEX DOCD: CPT | Performed by: FAMILY MEDICINE

## 2021-03-02 PROCEDURE — 73610 X-RAY EXAM OF ANKLE: CPT | Performed by: FAMILY MEDICINE

## 2021-03-02 PROCEDURE — 3078F DIAST BP <80 MM HG: CPT | Performed by: FAMILY MEDICINE

## 2021-03-02 PROCEDURE — 99213 OFFICE O/P EST LOW 20 MIN: CPT | Performed by: FAMILY MEDICINE

## 2021-03-02 RX ORDER — ALPRAZOLAM 0.25 MG/1
0.25 TABLET ORAL
Qty: 30 TABLET | Refills: 0 | Status: SHIPPED | OUTPATIENT
Start: 2021-03-02 | End: 2021-04-27

## 2021-03-02 NOTE — PROGRESS NOTES
HPI:    Patient ID: Doug Arceo is a 54year old female. Pt presents with an injury of right ankle about a month ago when she fell down stairs outside her place. Pt had a cut of the lateral side of ankle which healed.  Pt did not seek medical attention content normal.              ASSESSMENT/PLAN:   Injury of right ankle, initial encounter:  - After discussion with patient, will check xray of right ankle. Follow up and further management after testing.     Anxiety:  - After discussion, will start alprazol

## 2021-03-02 NOTE — TELEPHONE ENCOUNTER
Called patient and discussed cytology. Discussed non-diagnostic. There were spindle cells which could be representative of normal muscle cells, GIST, or schwannoma or leiomyoma.  Discussed repeat EUS is recommended to try and help determine this and woul

## 2021-03-11 ENCOUNTER — PATIENT MESSAGE (OUTPATIENT)
Dept: GASTROENTEROLOGY | Facility: CLINIC | Age: 46
End: 2021-03-11

## 2021-03-11 ENCOUNTER — TELEPHONE (OUTPATIENT)
Dept: FAMILY MEDICINE CLINIC | Facility: CLINIC | Age: 46
End: 2021-03-11

## 2021-03-11 DIAGNOSIS — K63.9 LESION OF COLON: Primary | ICD-10-CM

## 2021-03-11 NOTE — TELEPHONE ENCOUNTER
Patients Last office visit note, Op reports, and results faxed to Dr. Papa Khan as requested by patient at [phone]. hiogi message sent to patient.

## 2021-03-11 NOTE — TELEPHONE ENCOUNTER
Patient checking status on message. Please fax to [phone] attn: Radha Sandy. For additional questions please call patient. Thank you.

## 2021-03-11 NOTE — TELEPHONE ENCOUNTER
Referral request noted. Referral approved, signed and sent to [facility].   Pt notified through [facility]

## 2021-03-11 NOTE — TELEPHONE ENCOUNTER
Dr. Roly Gould, patient is requesting a referral to Dr. Florencia Cunningham. Patient was referred to Dr. Florencia Cunningham by Dr. Missy Diaz for a 2nd opinion. Referral has been pended, please advise.      [facility]. @ [facility] [employer] [phone]

## 2021-03-19 ENCOUNTER — PATIENT MESSAGE (OUTPATIENT)
Dept: GASTROENTEROLOGY | Facility: CLINIC | Age: 46
End: 2021-03-19

## 2021-03-19 NOTE — TELEPHONE ENCOUNTER
From: Dennise Briones  To: Juan Ta MD  Sent: 3/19/2021 12:44 PM CDT  Subject: Other    Dr. Chacha Dinero at [facility] still has not received my records from your office.  Please fax my records to [facility] [phone]

## 2021-03-25 ENCOUNTER — PATIENT MESSAGE (OUTPATIENT)
Dept: FAMILY MEDICINE CLINIC | Facility: CLINIC | Age: 46
End: 2021-03-25

## 2021-03-25 DIAGNOSIS — K62.89 ANAL OR RECTAL PAIN: Primary | ICD-10-CM

## 2021-03-25 DIAGNOSIS — R11.0 NAUSEA: ICD-10-CM

## 2021-03-25 NOTE — TELEPHONE ENCOUNTER
Please see other MyChart encounter.     From: Marvin Bacon  To: Candice Day MD  Sent: 3/25/2021 12:08 PM CDT  Subject: Referral Request    The referral was authorized per my chart but shows Consultation I already had consult I need this to say outpatien

## 2021-03-25 NOTE — TELEPHONE ENCOUNTER
From: Jenni Roe  To: Sushil Nicole MD  Sent: 3/25/2021 9:25 AM CDT  Subject: Referral Request    HI Dr. Jolanta Xiao seen Dr. 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

## 2021-03-26 ENCOUNTER — TELEPHONE (OUTPATIENT)
Dept: GASTROENTEROLOGY | Facility: CLINIC | Age: 46
End: 2021-03-26

## 2021-03-27 ENCOUNTER — MED REC SCAN ONLY (OUTPATIENT)
Dept: FAMILY MEDICINE CLINIC | Facility: CLINIC | Age: 46
End: 2021-03-27

## 2021-03-31 NOTE — TELEPHONE ENCOUNTER
Office note from [employer] LEYLA reviewed and sent for scanning    Lia Rose MD  [facility], [facility] - Gastroenterology  3/31/2021  8:16 AM

## 2021-04-01 ENCOUNTER — NURSE TRIAGE (OUTPATIENT)
Dept: FAMILY MEDICINE CLINIC | Facility: CLINIC | Age: 46
End: 2021-04-01

## 2021-04-01 NOTE — TELEPHONE ENCOUNTER
Action Requested: Summary for Provider     []  Critical Lab, Recommendations Needed  [x] Need Additional Advice  []   FYI    []   Need Orders  [] Need Medications Sent to Pharmacy  []  Other     SUMMARY: Patient requesting otc treatment recommendations for

## 2021-04-01 NOTE — TELEPHONE ENCOUNTER
Can try sudafed to see if that works. She can also try mucinex. Salt gargles and breathe in steam as well. Call back with worsening symptoms or concerns.

## 2021-04-01 NOTE — TELEPHONE ENCOUNTER
Patient was called and given recommendations  Patient verbalized understanding and agreed to plan of care

## 2021-04-19 ENCOUNTER — TELEPHONE (OUTPATIENT)
Dept: GASTROENTEROLOGY | Facility: CLINIC | Age: 46
End: 2021-04-19

## 2021-04-19 NOTE — TELEPHONE ENCOUNTER
Dr. Khoa Corrales,    EGD and EUS results from [facility] placed on your desk for review, also in care everywhere.

## 2021-04-20 NOTE — TELEPHONE ENCOUNTER
Sending for scanning    Lindsay Hurt MD  [facility], [facility] - Gastroenterology  4/20/2021  8:59 AM

## 2021-04-27 ENCOUNTER — PATIENT MESSAGE (OUTPATIENT)
Dept: FAMILY MEDICINE CLINIC | Facility: CLINIC | Age: 46
End: 2021-04-27

## 2021-04-27 RX ORDER — ALPRAZOLAM 0.25 MG/1
0.25 TABLET ORAL
Qty: 30 TABLET | Refills: 0 | Status: SHIPPED | OUTPATIENT
Start: 2021-04-27 | End: 2021-06-26

## 2021-04-27 NOTE — TELEPHONE ENCOUNTER
Message noted: Chart reviewed and may refill alprazolam as requested. Script sent to listed pharmacy by secure method. [place]  reviewed [place]. No concerns noted. Please notify patient/ Pt notified by [facility].

## 2021-04-27 NOTE — TELEPHONE ENCOUNTER
From: Florian Murrieta  To: Spencer Goodpasture, MD  Sent: 4/27/2021 3:10 PM CDT  Subject: Prescription Question    Good afternoon,    Wondering if can me a script to my [facility] in [place] for the Xanax and if i can get the nictone patch I am ready to completly [employer]

## 2021-05-08 ENCOUNTER — PATIENT MESSAGE (OUTPATIENT)
Dept: FAMILY MEDICINE CLINIC | Facility: CLINIC | Age: 46
End: 2021-05-08

## 2021-05-08 ENCOUNTER — NURSE TRIAGE (OUTPATIENT)
Dept: FAMILY MEDICINE CLINIC | Facility: CLINIC | Age: 46
End: 2021-05-08

## 2021-05-08 DIAGNOSIS — R30.0 DYSURIA: Primary | ICD-10-CM

## 2021-05-08 NOTE — TELEPHONE ENCOUNTER
Action Requested: Summary for Provider     []  Critical Lab, Recommendations Needed  [x] Need Additional Advice  []   FYI    []   Need Orders  [] Need Medications Sent to Pharmacy  []  Other     SUMMARY: pt asking for lab order and antibiotics for UTi symp

## 2021-05-08 NOTE — TELEPHONE ENCOUNTER
From: Atif Devlin  To: Maricarmen Fabian MD  Sent: 5/8/2021 6:28 AM CDT  Subject: Other    Feel like I have uti again can I have order for lab and same prescription from my last uti

## 2021-05-08 NOTE — TELEPHONE ENCOUNTER
Verified pt name and . Reviewed doctor's recommendations as noted below. Pt states she will go to lab tomorrow and had no further questions.

## 2021-05-08 NOTE — TELEPHONE ENCOUNTER
----- Message from Lisa Briones sent at 5/8/2021  6:28 AM CDT -----  Regarding: Other  Contact: [phone]  Feel like I have uti again can I have order for lab and same prescription from my last uti

## 2021-05-11 ENCOUNTER — LAB ENCOUNTER (OUTPATIENT)
Dept: LAB | Facility: HOSPITAL | Age: 46
End: 2021-05-11
Attending: FAMILY MEDICINE
Payer: COMMERCIAL

## 2021-05-11 DIAGNOSIS — R30.0 DYSURIA: ICD-10-CM

## 2021-05-11 PROCEDURE — 87186 SC STD MICRODIL/AGAR DIL: CPT

## 2021-05-11 PROCEDURE — 87086 URINE CULTURE/COLONY COUNT: CPT

## 2021-05-11 PROCEDURE — 87077 CULTURE AEROBIC IDENTIFY: CPT

## 2021-05-11 RX ORDER — NITROFURANTOIN 25; 75 MG/1; MG/1
100 CAPSULE ORAL 2 TIMES DAILY
Qty: 14 CAPSULE | Refills: 0 | Status: SHIPPED | OUTPATIENT
Start: 2021-05-11 | End: 2021-06-25

## 2021-05-11 NOTE — TELEPHONE ENCOUNTER
Patient called back stating she is heading to the lab now for urine culture. Patient complaining of right sided pain/back pain. Patient requesting antibiotic be sent to pharmacy so she can start after completing urine culture.      Dr. China Angel:   Please adv

## 2021-06-25 ENCOUNTER — APPOINTMENT (OUTPATIENT)
Dept: CT IMAGING | Age: 46
End: 2021-06-25
Attending: EMERGENCY MEDICINE
Payer: COMMERCIAL

## 2021-06-25 ENCOUNTER — PATIENT MESSAGE (OUTPATIENT)
Dept: FAMILY MEDICINE CLINIC | Facility: CLINIC | Age: 46
End: 2021-06-25

## 2021-06-25 ENCOUNTER — HOSPITAL ENCOUNTER (OUTPATIENT)
Age: 46
Discharge: HOME | End: 2021-06-25
Attending: EMERGENCY MEDICINE
Payer: COMMERCIAL

## 2021-06-25 VITALS
SYSTOLIC BLOOD PRESSURE: 109 MMHG | HEART RATE: 61 BPM | TEMPERATURE: 97 F | OXYGEN SATURATION: 99 % | RESPIRATION RATE: 20 BRPM | DIASTOLIC BLOOD PRESSURE: 75 MMHG

## 2021-06-25 DIAGNOSIS — N12 PYELONEPHRITIS: Primary | ICD-10-CM

## 2021-06-25 PROCEDURE — 99214 OFFICE O/P EST MOD 30 MIN: CPT

## 2021-06-25 PROCEDURE — 87086 URINE CULTURE/COLONY COUNT: CPT | Performed by: EMERGENCY MEDICINE

## 2021-06-25 PROCEDURE — 81002 URINALYSIS NONAUTO W/O SCOPE: CPT

## 2021-06-25 PROCEDURE — 74176 CT ABD & PELVIS W/O CONTRAST: CPT | Performed by: EMERGENCY MEDICINE

## 2021-06-25 PROCEDURE — 81025 URINE PREGNANCY TEST: CPT

## 2021-06-25 RX ORDER — HYDROCODONE BITARTRATE AND ACETAMINOPHEN 5; 325 MG/1; MG/1
1 TABLET ORAL EVERY 6 HOURS PRN
Qty: 6 TABLET | Refills: 0 | Status: SHIPPED | OUTPATIENT
Start: 2021-06-25 | End: 2021-07-02

## 2021-06-25 RX ORDER — PHENAZOPYRIDINE HYDROCHLORIDE 200 MG/1
200 TABLET, FILM COATED ORAL 3 TIMES DAILY PRN
Qty: 6 TABLET | Refills: 0 | Status: SHIPPED | OUTPATIENT
Start: 2021-06-25 | End: 2021-07-02

## 2021-06-25 RX ORDER — CIPROFLOXACIN 500 MG/1
500 TABLET, FILM COATED ORAL 2 TIMES DAILY
Qty: 14 TABLET | Refills: 0 | Status: SHIPPED | OUTPATIENT
Start: 2021-06-25 | End: 2021-07-02

## 2021-06-25 RX ORDER — CIPROFLOXACIN 500 MG/1
500 TABLET, FILM COATED ORAL 2 TIMES DAILY
Qty: 28 TABLET | Refills: 0 | Status: SHIPPED | OUTPATIENT
Start: 2021-06-25 | End: 2021-06-25

## 2021-06-25 NOTE — ED INITIAL ASSESSMENT (HPI)
Urinary burning for 1 week, was just on antibiotics for a UTI about 2 weeks ago. Reports some back pain and right side/flank pain.

## 2021-06-25 NOTE — TELEPHONE ENCOUNTER
From: Gen Wray  To: Renard Iverson MD  Sent: 6/25/2021 2:44 PM CDT  Subject: Visit Follow-up Question    I feel like I am having the symptoms of UTI again after the last antibitoics a few weeks ago I felt better but starting have those symptoms again.

## 2021-06-26 RX ORDER — ALPRAZOLAM 0.25 MG/1
0.25 TABLET ORAL
Qty: 30 TABLET | Refills: 0 | Status: SHIPPED | OUTPATIENT
Start: 2021-06-26

## 2021-06-26 NOTE — TELEPHONE ENCOUNTER
Message noted: Chart reviewed and may refill medication as requested. Script sent to listed pharmacy by secure method.     Pt notified through [facility]

## 2021-06-26 NOTE — ED PROVIDER NOTES
Patient Seen in: [facility]      History   Patient presents with:  Urinary Symptoms    Stated Complaint: Urinary Symptoms     HPI/Subjective:   HPI    51year old female with a past medical history of diverticulosis, recurrent UTI who present Cardiovascular:      Rate and Rhythm: Normal rate and regular rhythm. Heart sounds: Normal heart sounds. No murmur heard. Pulmonary:      Effort: Pulmonary effort is normal. No respiratory distress. Breath sounds: Normal breath sounds.  No w symptoms.   Disposition and Plan     Clinical Impression:  Pyelonephritis  (primary encounter diagnosis)     Disposition:  Discharge  6/25/2021  8:37 pm    Follow-up:  [facility] [id number] [phone]    Schedule an

## 2021-07-27 NOTE — ED NOTES
30cc blood output in chest tube canister. Verbal order from Dr. Alia Romero to leave patient to continues low suction. Show Aperture Variable?: Yes Render Post-Care Instructions In Note?: no Post-Care Instructions: I reviewed with the patient in detail post-care instructions. Patient is to wear sunprotection, and avoid picking at any of the treated lesions. Pt may apply Vaseline to crusted or scabbing areas. Number Of Freeze-Thaw Cycles: 1 freeze-thaw cycle Medical Necessity Clause: This procedure was medically necessary because the lesions that were treated were: Medical Necessity Information: It is in your best interest to select a reason for this procedure from the list below. All of these items fulfill various CMS LCD requirements except the new and changing color options. Detail Level: Simple Consent: The patient's consent was obtained including but not limited to risks of crusting, scabbing, blistering, scarring, darker or lighter pigmentary change, recurrence, incomplete removal and infection. Duration Of Freeze Thaw-Cycle (Seconds): 0 Detail Level: Detailed

## 2021-08-02 ENCOUNTER — NURSE TRIAGE (OUTPATIENT)
Dept: FAMILY MEDICINE CLINIC | Facility: CLINIC | Age: 46
End: 2021-08-02

## 2021-08-02 NOTE — TELEPHONE ENCOUNTER
Patient calling reports could not make it tho the [place] ER and she is currently at [facility] ER     Staff please follow  up on 8/3    Routing as FYI

## 2021-08-02 NOTE — TELEPHONE ENCOUNTER
Action Requested: Summary for Provider     []  Critical Lab, Recommendations Needed  [] Need Additional Advice  []   FYI    []   Need Orders  [] Need Medications Sent to Pharmacy  []  Other     SUMMARY:  Pt states a week ago she had a real bad muscle cramp

## 2021-08-03 NOTE — TELEPHONE ENCOUNTER
Called patient to follow-up on ER visit. Per patient , an ultrasound doppler was done on right leg and showed no acute DVT. Patient was advised that bruising  should heal on its own and was advised to take Ibuprofen as needed for pain.    Patient states

## 2021-08-05 ENCOUNTER — MED REC SCAN ONLY (OUTPATIENT)
Dept: FAMILY MEDICINE CLINIC | Facility: CLINIC | Age: 46
End: 2021-08-05

## 2021-09-14 ENCOUNTER — HOSPITAL ENCOUNTER (EMERGENCY)
Facility: HOSPITAL | Age: 46
Discharge: HOME | End: 2021-09-14
Attending: EMERGENCY MEDICINE
Payer: COMMERCIAL

## 2021-09-14 ENCOUNTER — APPOINTMENT (OUTPATIENT)
Dept: GENERAL RADIOLOGY | Facility: HOSPITAL | Age: 46
End: 2021-09-14
Payer: COMMERCIAL

## 2021-09-14 ENCOUNTER — APPOINTMENT (OUTPATIENT)
Dept: CT IMAGING | Facility: HOSPITAL | Age: 46
End: 2021-09-14
Attending: EMERGENCY MEDICINE
Payer: COMMERCIAL

## 2021-09-14 ENCOUNTER — NURSE TRIAGE (OUTPATIENT)
Dept: FAMILY MEDICINE CLINIC | Facility: CLINIC | Age: 46
End: 2021-09-14

## 2021-09-14 VITALS
BODY MASS INDEX: 22.76 KG/M2 | SYSTOLIC BLOOD PRESSURE: 119 MMHG | RESPIRATION RATE: 16 BRPM | TEMPERATURE: 99 F | HEART RATE: 71 BPM | DIASTOLIC BLOOD PRESSURE: 70 MMHG | WEIGHT: 145 LBS | HEIGHT: 67 IN | OXYGEN SATURATION: 97 %

## 2021-09-14 DIAGNOSIS — J02.0 STREP PHARYNGITIS: Primary | ICD-10-CM

## 2021-09-14 DIAGNOSIS — R07.89 CHEST PAIN, ATYPICAL: ICD-10-CM

## 2021-09-14 LAB
ANION GAP SERPL CALC-SCNC: 4 MMOL/L (ref 0–18)
BASOPHILS # BLD AUTO: 0.1 X10(3) UL (ref 0–0.2)
BASOPHILS NFR BLD AUTO: 1.1 %
BUN BLD-MCNC: 11 MG/DL (ref 7–18)
BUN/CREAT SERPL: 15.3 (ref 10–20)
CALCIUM BLD-MCNC: 8.9 MG/DL (ref 8.5–10.1)
CHLORIDE SERPL-SCNC: 109 MMOL/L (ref 98–112)
CK SERPL-CCNC: 63 U/L
CO2 SERPL-SCNC: 28 MMOL/L (ref 21–32)
CREAT BLD-MCNC: 0.72 MG/DL
D DIMER PPP FEU-MCNC: 0.64 UG/ML FEU (ref ?–0.5)
DEPRECATED RDW RBC AUTO: 42.5 FL (ref 35.1–46.3)
EOSINOPHIL # BLD AUTO: 0.19 X10(3) UL (ref 0–0.7)
EOSINOPHIL NFR BLD AUTO: 2.1 %
ERYTHROCYTE [DISTWIDTH] IN BLOOD BY AUTOMATED COUNT: 12.3 % (ref 11–15)
GLUCOSE BLD-MCNC: 80 MG/DL (ref 70–99)
HCT VFR BLD AUTO: 39.7 %
HGB BLD-MCNC: 13.5 G/DL
IMM GRANULOCYTES # BLD AUTO: 0.02 X10(3) UL (ref 0–1)
IMM GRANULOCYTES NFR BLD: 0.2 %
LYMPHOCYTES # BLD AUTO: 3.47 X10(3) UL (ref 1–4)
LYMPHOCYTES NFR BLD AUTO: 38.5 %
MCH RBC QN AUTO: 32.1 PG (ref 26–34)
MCHC RBC AUTO-ENTMCNC: 34 G/DL (ref 31–37)
MCV RBC AUTO: 94.3 FL
MONOCYTES # BLD AUTO: 0.85 X10(3) UL (ref 0.1–1)
MONOCYTES NFR BLD AUTO: 9.4 %
NEUTROPHILS # BLD AUTO: 4.39 X10 (3) UL (ref 1.5–7.7)
NEUTROPHILS # BLD AUTO: 4.39 X10(3) UL (ref 1.5–7.7)
NEUTROPHILS NFR BLD AUTO: 48.7 %
OSMOLALITY SERPL CALC.SUM OF ELEC: 290 MOSM/KG (ref 275–295)
PLATELET # BLD AUTO: 336 10(3)UL (ref 150–450)
POTASSIUM SERPL-SCNC: 3.6 MMOL/L (ref 3.5–5.1)
RBC # BLD AUTO: 4.21 X10(6)UL
S PYO AG THROAT QL: POSITIVE
SARS-COV-2 RNA RESP QL NAA+PROBE: NOT DETECTED
SODIUM SERPL-SCNC: 141 MMOL/L (ref 136–145)
TROPONIN I SERPL-MCNC: <0.045 NG/ML (ref ?–0.04)
WBC # BLD AUTO: 9 X10(3) UL (ref 4–11)

## 2021-09-14 PROCEDURE — 85379 FIBRIN DEGRADATION QUANT: CPT | Performed by: EMERGENCY MEDICINE

## 2021-09-14 PROCEDURE — 84484 ASSAY OF TROPONIN QUANT: CPT | Performed by: EMERGENCY MEDICINE

## 2021-09-14 PROCEDURE — 99284 EMERGENCY DEPT VISIT MOD MDM: CPT

## 2021-09-14 PROCEDURE — 80048 BASIC METABOLIC PNL TOTAL CA: CPT | Performed by: EMERGENCY MEDICINE

## 2021-09-14 PROCEDURE — 96375 TX/PRO/DX INJ NEW DRUG ADDON: CPT

## 2021-09-14 PROCEDURE — 82550 ASSAY OF CK (CPK): CPT | Performed by: EMERGENCY MEDICINE

## 2021-09-14 PROCEDURE — 71260 CT THORAX DX C+: CPT | Performed by: EMERGENCY MEDICINE

## 2021-09-14 PROCEDURE — 87880 STREP A ASSAY W/OPTIC: CPT

## 2021-09-14 PROCEDURE — 96374 THER/PROPH/DIAG INJ IV PUSH: CPT

## 2021-09-14 PROCEDURE — 71045 X-RAY EXAM CHEST 1 VIEW: CPT | Performed by: EMERGENCY MEDICINE

## 2021-09-14 PROCEDURE — 85025 COMPLETE CBC W/AUTO DIFF WBC: CPT | Performed by: EMERGENCY MEDICINE

## 2021-09-14 PROCEDURE — 80048 BASIC METABOLIC PNL TOTAL CA: CPT

## 2021-09-14 PROCEDURE — 93010 ELECTROCARDIOGRAM REPORT: CPT | Performed by: INTERNAL MEDICINE

## 2021-09-14 PROCEDURE — 85025 COMPLETE CBC W/AUTO DIFF WBC: CPT

## 2021-09-14 PROCEDURE — 93005 ELECTROCARDIOGRAM TRACING: CPT

## 2021-09-14 RX ORDER — CEPHALEXIN 500 MG/1
500 CAPSULE ORAL 2 TIMES DAILY
Qty: 20 CAPSULE | Refills: 0 | Status: CANCELLED | OUTPATIENT
Start: 2021-09-14 | End: 2021-09-24

## 2021-09-14 RX ORDER — MELOXICAM 7.5 MG/1
7.5 TABLET ORAL DAILY PRN
Qty: 14 TABLET | Refills: 0 | Status: SHIPPED | OUTPATIENT
Start: 2021-09-14 | End: 2021-09-28

## 2021-09-14 RX ORDER — KETOROLAC TROMETHAMINE 15 MG/ML
15 INJECTION, SOLUTION INTRAMUSCULAR; INTRAVENOUS ONCE
Status: COMPLETED | OUTPATIENT
Start: 2021-09-14 | End: 2021-09-14

## 2021-09-14 RX ORDER — ONDANSETRON 4 MG/1
4 TABLET, ORALLY DISINTEGRATING ORAL EVERY 8 HOURS PRN
Qty: 15 TABLET | Refills: 0 | Status: SHIPPED | OUTPATIENT
Start: 2021-09-14 | End: 2021-09-19

## 2021-09-14 RX ORDER — CEPHALEXIN 500 MG/1
500 CAPSULE ORAL 2 TIMES DAILY
Qty: 20 CAPSULE | Refills: 0 | Status: SHIPPED | OUTPATIENT
Start: 2021-09-14 | End: 2021-09-24

## 2021-09-14 RX ORDER — ORPHENADRINE CITRATE 30 MG/ML
30 INJECTION INTRAMUSCULAR; INTRAVENOUS ONCE
Status: COMPLETED | OUTPATIENT
Start: 2021-09-14 | End: 2021-09-14

## 2021-09-14 RX ORDER — ORPHENADRINE CITRATE 100 MG/1
100 TABLET, EXTENDED RELEASE ORAL 2 TIMES DAILY
Qty: 20 TABLET | Refills: 0 | Status: SHIPPED | OUTPATIENT
Start: 2021-09-14 | End: 2021-09-24

## 2021-09-14 RX ORDER — CEPHALEXIN 500 MG/1
500 CAPSULE ORAL ONCE
Status: COMPLETED | OUTPATIENT
Start: 2021-09-14 | End: 2021-09-14

## 2021-09-14 NOTE — ED INITIAL ASSESSMENT (HPI)
Pt to ED for c/o midsternal and right sided chest pain onset Saturday evening. Pt states she was exposed to a covid + person on Wednesday, has had a test but does not have results.  Since then has had sore throat, abd pain, diarrhea, and chest pain, and dry

## 2021-09-14 NOTE — ED PROVIDER NOTES
Patient Seen in: [facility] AND [facility] Emergency Department    History   Patient presents with:  Chest Pain Angina    Stated Complaint: Chest Pain.  Postive covid exposure     HPI    50 yo F with PMH spontaneous PTX in remote past s/p lobectomy/pleurodesis pres d.55   • Cancer Maternal Uncle 56        tongue cancer-cause of death; d.57, ex-smoker   • Other (Other) Brother         intellectual disability   • Ovarian Cancer Maternal Aunt 45        d.45; ovarian ca   • Cancer Maternal Uncle 46        d.47; lung ca, Nontender  Musculoskeletal: No gross deformity. Neurological: Alert. Skin: Skin is warm. Psychiatric: Cooperative. Nursing note and vitals reviewed.         ED Course     Labs Reviewed   D-DIMER - Abnormal; Notable for the following components: right lung apex, similar to prior. There is a new focal somewhat linear area of opacity in the lateral right lung base (of note this is along the previous course of a chest tube and may reflect scarring) .   A very subtle focus of ground-glass opacity in t probably combination of subsegmental atelectasis and scarring.   There is a more focal 1.4 x 1.9 cm pleural base nodularity (series 4, image 108) which given the previous findings of pneumothorax in 2019 requiring chest tube in this area probably reflect sc recommend 3 month follow-up chest CT to ensure stability and exclude an underlying neoplastic lesion. 3. Lesser incidental findings as above.     Dictated by (CST): Minerva Carlos MD on 9/14/2021 at 6:36 PM     Finalized by (CST): Minerva Carlos MD on 9/14/2 basic health screening including reassessment of your blood pressure.       You had elevated blood pressure today and you need to follow up with your doctor for a repeat blood pressure check and further discussion of lifestyle modifications that include Felecia Victor

## 2021-09-14 NOTE — TELEPHONE ENCOUNTER
Action Requested: Summary for Provider     []  Critical Lab, Recommendations Needed  [] Need Additional Advice  [x]   FYI    []   Need Orders  [] Need Medications Sent to Pharmacy  []  Other     SUMMARY:     Spoke with pt,  verified, pt stated last

## 2021-09-15 ENCOUNTER — TELEPHONE (OUTPATIENT)
Dept: FAMILY MEDICINE CLINIC | Facility: CLINIC | Age: 46
End: 2021-09-15

## 2021-09-15 NOTE — TELEPHONE ENCOUNTER
Pt states she was see in ER yesterday and diagnosed with strep throat, Covid test was negative. Pt states she needs a letter to return to work on Monday. [facility]      Pt states she just started taking the antibiotic toda

## 2021-09-15 NOTE — TELEPHONE ENCOUNTER
Message noted and letter generated as requested. Sent to patient via [facility]. Pt notified.

## 2021-10-12 NOTE — CM/SW NOTE
Saw pt at bedside to discuss plan for dc home today. Pt tearful and states she will be home alone and really wants to leave tomorrow.  I explained to Pippa Solorzano that she has been medically cleared for dc, has already stayed an extra day and ins might deny her dre Bactrim Pregnancy And Lactation Text: This medication is Pregnancy Category D and is known to cause fetal risk.  It is also excreted in breast milk.

## 2021-11-11 ENCOUNTER — OFFICE VISIT (OUTPATIENT)
Dept: FAMILY MEDICINE CLINIC | Facility: CLINIC | Age: 46
End: 2021-11-11
Payer: COMMERCIAL

## 2021-11-11 VITALS
SYSTOLIC BLOOD PRESSURE: 130 MMHG | BODY MASS INDEX: 23.8 KG/M2 | HEIGHT: 67 IN | DIASTOLIC BLOOD PRESSURE: 84 MMHG | WEIGHT: 151.63 LBS | HEART RATE: 75 BPM

## 2021-11-11 DIAGNOSIS — F41.9 ANXIETY: ICD-10-CM

## 2021-11-11 DIAGNOSIS — M21.612 BUNION OF LEFT FOOT: ICD-10-CM

## 2021-11-11 DIAGNOSIS — Z00.00 ROUTINE PHYSICAL EXAMINATION: Primary | ICD-10-CM

## 2021-11-11 PROCEDURE — 90471 IMMUNIZATION ADMIN: CPT | Performed by: FAMILY MEDICINE

## 2021-11-11 PROCEDURE — 3075F SYST BP GE 130 - 139MM HG: CPT | Performed by: FAMILY MEDICINE

## 2021-11-11 PROCEDURE — 90686 IIV4 VACC NO PRSV 0.5 ML IM: CPT | Performed by: FAMILY MEDICINE

## 2021-11-11 PROCEDURE — 3079F DIAST BP 80-89 MM HG: CPT | Performed by: FAMILY MEDICINE

## 2021-11-11 PROCEDURE — 99396 PREV VISIT EST AGE 40-64: CPT | Performed by: FAMILY MEDICINE

## 2021-11-11 PROCEDURE — 3008F BODY MASS INDEX DOCD: CPT | Performed by: FAMILY MEDICINE

## 2021-11-11 NOTE — PROGRESS NOTES
Subjective:   Patient ID: Fabricio Rene is a 51year old female. Pt is here for her routine well woman exam and pap smear. No acute issues. No chronic medical problems. No family history of gynecological cancers or issues.  No history of abnormal pap sm membranes are moist.      Pharynx: No posterior oropharyngeal erythema. Eyes:      Conjunctiva/sclera: Conjunctivae normal.   Neck:      Thyroid: No thyroid mass, thyromegaly or thyroid tenderness.    Cardiovascular:      Rate and Rhythm: Normal rate and renewed. Follow up and further management after testing. To call if any problems. Routine follow up with gyne to discuss periods issues. Anxiety:  - Stable, continue present management.     Bunion of left foot:  - After discussion, will send to podiatry

## 2021-11-12 ENCOUNTER — HOSPITAL ENCOUNTER (OUTPATIENT)
Dept: MAMMOGRAPHY | Facility: HOSPITAL | Age: 46
Discharge: HOME | End: 2021-11-12
Attending: FAMILY MEDICINE
Payer: COMMERCIAL

## 2021-11-12 ENCOUNTER — LAB ENCOUNTER (OUTPATIENT)
Dept: LAB | Facility: HOSPITAL | Age: 46
End: 2021-11-12
Attending: FAMILY MEDICINE
Payer: COMMERCIAL

## 2021-11-12 DIAGNOSIS — Z12.31 ENCOUNTER FOR MAMMOGRAM TO ESTABLISH BASELINE MAMMOGRAM: ICD-10-CM

## 2021-11-12 DIAGNOSIS — Z00.00 ROUTINE PHYSICAL EXAMINATION: ICD-10-CM

## 2021-11-12 PROCEDURE — 85027 COMPLETE CBC AUTOMATED: CPT

## 2021-11-12 PROCEDURE — 36415 COLL VENOUS BLD VENIPUNCTURE: CPT

## 2021-11-12 PROCEDURE — 77067 SCR MAMMO BI INCL CAD: CPT | Performed by: FAMILY MEDICINE

## 2021-11-12 PROCEDURE — 77063 BREAST TOMOSYNTHESIS BI: CPT | Performed by: FAMILY MEDICINE

## 2021-11-12 PROCEDURE — 80061 LIPID PANEL: CPT

## 2021-11-12 PROCEDURE — 84443 ASSAY THYROID STIM HORMONE: CPT

## 2021-11-12 PROCEDURE — 80053 COMPREHEN METABOLIC PANEL: CPT

## 2021-11-15 ENCOUNTER — TELEPHONE (OUTPATIENT)
Dept: FAMILY MEDICINE CLINIC | Facility: CLINIC | Age: 46
End: 2021-11-15

## 2021-11-15 NOTE — TELEPHONE ENCOUNTER
----- Message from Jocelyn Briones sent at 11/15/2021  3:39 PM CST -----  Regarding: treatment bacterial vaginosis  Hi Dr. Guy Jimenez,    Can you send the prescription for bacterial vaginosis. I have a little discharge so would like to treat this.     Thank you  Karri Malone

## 2021-11-16 ENCOUNTER — PATIENT MESSAGE (OUTPATIENT)
Dept: FAMILY MEDICINE CLINIC | Facility: CLINIC | Age: 46
End: 2021-11-16

## 2021-11-16 RX ORDER — METRONIDAZOLE 500 MG/1
500 TABLET ORAL 2 TIMES DAILY
Qty: 14 TABLET | Refills: 0 | Status: SHIPPED | OUTPATIENT
Start: 2021-11-16 | End: 2022-01-18 | Stop reason: ALTCHOICE

## 2021-11-16 NOTE — TELEPHONE ENCOUNTER
From: Elisabet Parker  To: Tasia Lewis MD  Sent: 11/16/2021 11:01 AM CST  Subject: Dr. Ilda Victor Recommedation    Thank you I will  script today

## 2021-11-16 NOTE — TELEPHONE ENCOUNTER
Patient seen in the office for pap and Dr. Marjorie Blanco mentioned BV in the result notes: Unremarkable pap smear results some ? Of BV. Pt contacted and notified of results.  Per patient she does have a bit of itching and discharge so she's requesting Diflucan to be

## 2021-11-16 NOTE — TELEPHONE ENCOUNTER
Message noted. May start flagyl as requested for bacterial vaginosis. Diflucan would not be helpful for the bacterial vaginosis that was found on the pap smear. Erx sent to listed pharmacy.  call if not better; Please notify patient

## 2021-11-16 NOTE — TELEPHONE ENCOUNTER
Left message to call back for Dr. Bisi Mendoza recommendations below--sent [employer] message of same

## 2021-11-24 ENCOUNTER — HOSPITAL ENCOUNTER (OUTPATIENT)
Dept: GENERAL RADIOLOGY | Facility: HOSPITAL | Age: 46
Discharge: HOME | End: 2021-11-24
Attending: PODIATRIST
Payer: COMMERCIAL

## 2021-11-24 ENCOUNTER — OFFICE VISIT (OUTPATIENT)
Dept: PODIATRY CLINIC | Facility: CLINIC | Age: 46
End: 2021-11-24
Payer: COMMERCIAL

## 2021-11-24 DIAGNOSIS — M21.619 BUNION: ICD-10-CM

## 2021-11-24 DIAGNOSIS — M21.619 BUNION: Primary | ICD-10-CM

## 2021-11-24 DIAGNOSIS — M79.675 PAIN OF TOE OF LEFT FOOT: ICD-10-CM

## 2021-11-24 PROCEDURE — 73630 X-RAY EXAM OF FOOT: CPT | Performed by: PODIATRIST

## 2021-11-24 PROCEDURE — 99203 OFFICE O/P NEW LOW 30 MIN: CPT | Performed by: PODIATRIST

## 2021-11-24 NOTE — PROGRESS NOTES
HPI:    Patient ID: Catarina Ragland is a 51year old female. This pleasant 21-year-old female presents as a new patient to me and is self-referred. The reason she is here is because of a painful left bunion.   She states is progressing increasing in size de including an osteotomy. I discussed not only the procedure but the postoperative course and the potential concerns and expectations. Clearly this is nonemergent and timing of this procedure is up this patient.   After a complete discussion I did take an x

## 2021-11-29 ENCOUNTER — TELEPHONE (OUTPATIENT)
Dept: PODIATRY CLINIC | Facility: CLINIC | Age: 46
End: 2021-11-29

## 2021-11-29 DIAGNOSIS — M21.612 BUNION, LEFT FOOT: Primary | ICD-10-CM

## 2021-11-29 NOTE — TELEPHONE ENCOUNTER
Received surgical scheduling request for bunionectomy, left foot. Called patient this date and left a message for her to call me back directly at [phone] to schedule.

## 2021-11-30 NOTE — TELEPHONE ENCOUNTER
Patient called back this date and is requesting surgery for 12/10/21 which was scheduled this date at [facility]. Managed care order placed and an email sent to [facility] as it is about 10 days away.   Patient was told I would call her back to review all details o

## 2021-12-02 NOTE — TELEPHONE ENCOUNTER
Patient called back this date and we reviewed instructions. Also scheduled post-op appointments. Vaccine Information Statement(s) was given today. This has been reviewed, questions answered, and verbal consent given by Patient for injection(s) and administration of Tetanus/Diphtheria/Pertussis (Tdap).        Patient tolerated without incident. See immunization grid for documentation.

## 2021-12-02 NOTE — TELEPHONE ENCOUNTER
Called patient this date and left a message confirming surgery and instructing her to review the pre-op instructions sent thru My Chart this date.   Asked patient to call me back at [phone] so that we can review them together and also schedule her post

## 2021-12-07 ENCOUNTER — LAB REQUISITION (OUTPATIENT)
Dept: SURGERY | Age: 46
End: 2021-12-07
Payer: COMMERCIAL

## 2021-12-07 DIAGNOSIS — Z01.818 PREOP EXAMINATION: ICD-10-CM

## 2021-12-17 ENCOUNTER — OFFICE VISIT (OUTPATIENT)
Dept: PODIATRY CLINIC | Facility: CLINIC | Age: 46
End: 2021-12-17
Payer: COMMERCIAL

## 2021-12-17 DIAGNOSIS — M21.612 BUNION, LEFT FOOT: Primary | ICD-10-CM

## 2021-12-17 PROCEDURE — 99024 POSTOP FOLLOW-UP VISIT: CPT | Performed by: PODIATRIST

## 2021-12-17 RX ORDER — ACETAMINOPHEN AND CODEINE PHOSPHATE 300; 30 MG/1; MG/1
1 TABLET ORAL EVERY 4 HOURS PRN
Qty: 20 TABLET | Refills: 0 | Status: SHIPPED | OUTPATIENT
Start: 2021-12-17 | End: 2021-12-31

## 2021-12-17 NOTE — PROGRESS NOTES
HPI:    Patient ID: Doug Arceo is a 51year old female. Patient presents 1 week post bunionectomy with no specific complaints she thinks that she is doing well. She has discomfort and occasionally takes oral pain medications with good success.       MARY Prescriptions Disp Refills   • acetaminophen-codeine 300-30 MG Oral Tab 20 tablet 0     Sig: Take 1 tablet by mouth every 4 (four) hours as needed for Pain (take with food).        Imaging & Referrals:  None       EO#[id number]

## 2021-12-23 ENCOUNTER — OFFICE VISIT (OUTPATIENT)
Dept: PODIATRY CLINIC | Facility: CLINIC | Age: 46
End: 2021-12-23
Payer: COMMERCIAL

## 2021-12-23 ENCOUNTER — HOSPITAL ENCOUNTER (OUTPATIENT)
Dept: GENERAL RADIOLOGY | Age: 46
Discharge: HOME | End: 2021-12-23
Attending: PODIATRIST
Payer: COMMERCIAL

## 2021-12-23 DIAGNOSIS — Z47.89 ORTHOPEDIC AFTERCARE: ICD-10-CM

## 2021-12-23 DIAGNOSIS — Z47.89 ORTHOPEDIC AFTERCARE: Primary | ICD-10-CM

## 2021-12-23 DIAGNOSIS — M21.612 BUNION, LEFT FOOT: ICD-10-CM

## 2021-12-23 PROCEDURE — 99024 POSTOP FOLLOW-UP VISIT: CPT | Performed by: PODIATRIST

## 2021-12-23 PROCEDURE — 73630 X-RAY EXAM OF FOOT: CPT | Performed by: PODIATRIST

## 2021-12-23 NOTE — PROGRESS NOTES
HPI:    Patient ID: Donna Somers is a 51year old female. The 8year-old female presents 2 weeks postsurgical for left bunionectomy. She has no specific noted complaints or concerns.     ROS:              Current Outpatient Medications   Medication Sig

## 2021-12-30 ENCOUNTER — TELEPHONE (OUTPATIENT)
Dept: PODIATRY CLINIC | Facility: CLINIC | Age: 46
End: 2021-12-30

## 2021-12-30 NOTE — TELEPHONE ENCOUNTER
Patient states she removed the dressing of her foot and is not experiencing pain. Patient requesting medication refill.  Please advise       acetaminophen-codeine 300-30 MG Oral Tab

## 2021-12-31 RX ORDER — ACETAMINOPHEN AND CODEINE PHOSPHATE 300; 30 MG/1; MG/1
1 TABLET ORAL EVERY 4 HOURS PRN
Qty: 20 TABLET | Refills: 0 | Status: SHIPPED | OUTPATIENT
Start: 2021-12-31

## 2021-12-31 NOTE — TELEPHONE ENCOUNTER
S/w pt and she states she took the bandages off on 12/29 as instructed and after that she seemed to have an increase in pain 6/10 and swelling. She states she has not been elevating and icing since she took the bandages off.  I advised her per Dr. Mauri Rowley

## 2022-01-04 ENCOUNTER — NURSE TRIAGE (OUTPATIENT)
Dept: FAMILY MEDICINE CLINIC | Facility: CLINIC | Age: 47
End: 2022-01-04

## 2022-01-04 ENCOUNTER — TELEMEDICINE (OUTPATIENT)
Dept: FAMILY MEDICINE CLINIC | Facility: CLINIC | Age: 47
End: 2022-01-04

## 2022-01-04 DIAGNOSIS — R05.9 COUGH: Primary | ICD-10-CM

## 2022-01-04 DIAGNOSIS — R50.9 FEVER, UNSPECIFIED FEVER CAUSE: Primary | ICD-10-CM

## 2022-01-04 PROCEDURE — 99213 OFFICE O/P EST LOW 20 MIN: CPT | Performed by: FAMILY MEDICINE

## 2022-01-04 RX ORDER — CODEINE PHOSPHATE AND GUAIFENESIN 10; 100 MG/5ML; MG/5ML
10 SOLUTION ORAL NIGHTLY PRN
Qty: 120 ML | Refills: 0 | Status: SHIPPED | OUTPATIENT
Start: 2022-01-04

## 2022-01-04 NOTE — TELEPHONE ENCOUNTER
Action Requested: Summary for Provider     []  Critical Lab, Recommendations Needed  [] Need Additional Advice  []   FYI    []   Need Orders  [] Need Medications Sent to Pharmacy  []  Other     SUMMARY:Pt s/s started Sunday with h/a, last night fever,sor

## 2022-01-04 NOTE — PROGRESS NOTES
This visit is conducted using Telemedicine with live, interactive video and audio. Patient has been referred to the [facility] website at [email or web address] to review the yearly Consent to Treat document.     Patient understands and accepts financial res Years: 10.00        Pack years: 1        Types: Cigarettes      Smokeless tobacco: Current User      Tobacco comment: 3 cigarettes a day     Vaping Use      Vaping Use: Never used    Alcohol use: Yes      Comment: occasionally    Drug use: No       REVIEW

## 2022-01-05 ENCOUNTER — NURSE ONLY (OUTPATIENT)
Dept: LAB | Facility: HOSPITAL | Age: 47
End: 2022-01-05
Attending: FAMILY MEDICINE
Payer: COMMERCIAL

## 2022-01-05 DIAGNOSIS — R50.9 FEVER, UNSPECIFIED FEVER CAUSE: ICD-10-CM

## 2022-01-08 LAB — SARS-COV-2 RNA RESP QL NAA+PROBE: DETECTED

## 2022-01-10 ENCOUNTER — TELEPHONE (OUTPATIENT)
Dept: FAMILY MEDICINE CLINIC | Facility: CLINIC | Age: 47
End: 2022-01-10

## 2022-01-10 RX ORDER — ALBUTEROL SULFATE 90 UG/1
2 AEROSOL, METERED RESPIRATORY (INHALATION) EVERY 4 HOURS PRN
Qty: 1 EACH | Refills: 0 | Status: SHIPPED | OUTPATIENT
Start: 2022-01-10

## 2022-01-10 NOTE — TELEPHONE ENCOUNTER
Scot calling to inform that:    Albuterol Sulfate 108 (90 Base) MCG/ACT Inhalation Aerosol Powder, Breath Activated    Is not covered, requesting replacement

## 2022-01-10 NOTE — TELEPHONE ENCOUNTER
Called pharmacy, confirmed patient's name and . Lizandro Lara states that her insurance will cover ProAir or Ventolin. Also, called patient to check on her since the inhaler was prescribed back on . She was able to speak clearly with no distress.  She st

## 2022-01-10 NOTE — TELEPHONE ENCOUNTER
Can send new script for proair inhaler. Erx sent. Pt notified through \[facility]\"" & HEALTH SERVICES  Follow up as needed.

## 2022-01-14 ENCOUNTER — TELEPHONE (OUTPATIENT)
Dept: FAMILY MEDICINE CLINIC | Facility: CLINIC | Age: 47
End: 2022-01-14

## 2022-01-14 NOTE — TELEPHONE ENCOUNTER
Pt's requesting RTW letter, tested Neg with rapid Job need letter stating Pt is negative and can RTW, Pt wants RTW letter to state 1/17/22  Letter sent   Pt has no s/s

## 2022-01-18 ENCOUNTER — OFFICE VISIT (OUTPATIENT)
Dept: PODIATRY CLINIC | Facility: CLINIC | Age: 47
End: 2022-01-18
Payer: COMMERCIAL

## 2022-01-18 DIAGNOSIS — M21.612 BUNION, LEFT FOOT: Primary | ICD-10-CM

## 2022-01-18 PROCEDURE — 99024 POSTOP FOLLOW-UP VISIT: CPT | Performed by: PODIATRIST

## 2022-01-18 NOTE — PROGRESS NOTES
HPI:    Patient ID: Giorgio Mittal is a 51year old female. This 25-year-old female presents 5 weeks post left bunionectomy. Patient is doing quite well and has some minimal discomforts depending upon the degree of activity.       ROS:              Current

## 2022-08-07 ENCOUNTER — HOSPITAL ENCOUNTER (OUTPATIENT)
Age: 47
Discharge: HOME | End: 2022-08-07
Payer: COMMERCIAL

## 2022-08-07 VITALS
OXYGEN SATURATION: 100 % | SYSTOLIC BLOOD PRESSURE: 135 MMHG | HEART RATE: 81 BPM | TEMPERATURE: 97 F | RESPIRATION RATE: 16 BRPM | DIASTOLIC BLOOD PRESSURE: 74 MMHG

## 2022-08-07 PROCEDURE — 99213 OFFICE O/P EST LOW 20 MIN: CPT

## 2022-08-07 PROCEDURE — 90471 IMMUNIZATION ADMIN: CPT

## 2022-08-07 NOTE — ED INITIAL ASSESSMENT (HPI)
Stepped on dirty nail on Friday night. Here for tetanus shot, does not remember last dose. States was likely > 10 years.

## 2022-08-25 ENCOUNTER — TELEMEDICINE (OUTPATIENT)
Dept: FAMILY MEDICINE CLINIC | Facility: CLINIC | Age: 47
End: 2022-08-25

## 2022-08-25 ENCOUNTER — NURSE TRIAGE (OUTPATIENT)
Dept: FAMILY MEDICINE CLINIC | Facility: CLINIC | Age: 47
End: 2022-08-25

## 2022-08-25 ENCOUNTER — LAB ENCOUNTER (OUTPATIENT)
Dept: LAB | Facility: HOSPITAL | Age: 47
End: 2022-08-25
Attending: FAMILY MEDICINE
Payer: COMMERCIAL

## 2022-08-25 DIAGNOSIS — R39.89 URINARY PROBLEM: ICD-10-CM

## 2022-08-25 DIAGNOSIS — R39.89 URINARY PROBLEM: Primary | ICD-10-CM

## 2022-08-25 PROCEDURE — 87086 URINE CULTURE/COLONY COUNT: CPT

## 2022-08-25 PROCEDURE — 99213 OFFICE O/P EST LOW 20 MIN: CPT | Performed by: FAMILY MEDICINE

## 2022-08-25 RX ORDER — CIPROFLOXACIN 250 MG/1
250 TABLET, FILM COATED ORAL 2 TIMES DAILY
Qty: 14 TABLET | Refills: 0 | Status: SHIPPED | OUTPATIENT
Start: 2022-08-25

## 2022-12-07 ENCOUNTER — NURSE TRIAGE (OUTPATIENT)
Dept: FAMILY MEDICINE CLINIC | Facility: CLINIC | Age: 47
End: 2022-12-07

## 2022-12-07 RX ORDER — ONDANSETRON 4 MG/1
4 TABLET, FILM COATED ORAL EVERY 8 HOURS PRN
Qty: 20 TABLET | Refills: 0 | Status: SHIPPED | OUTPATIENT
Start: 2022-12-07 | End: 2022-12-14

## 2022-12-07 NOTE — TELEPHONE ENCOUNTER
Message noted. May start zofran as requested for nausea. Erx sent to listed pharmacy. To follow up for appointment as scheduled.  Please notify patient

## 2022-12-08 ENCOUNTER — OFFICE VISIT (OUTPATIENT)
Dept: FAMILY MEDICINE CLINIC | Facility: CLINIC | Age: 47
End: 2022-12-08
Payer: COMMERCIAL

## 2022-12-08 ENCOUNTER — LAB ENCOUNTER (OUTPATIENT)
Dept: LAB | Age: 47
End: 2022-12-08
Attending: FAMILY MEDICINE
Payer: COMMERCIAL

## 2022-12-08 VITALS
BODY MASS INDEX: 23.7 KG/M2 | HEART RATE: 67 BPM | SYSTOLIC BLOOD PRESSURE: 115 MMHG | TEMPERATURE: 98 F | DIASTOLIC BLOOD PRESSURE: 75 MMHG | RESPIRATION RATE: 18 BRPM | WEIGHT: 151 LBS | HEIGHT: 67 IN

## 2022-12-08 DIAGNOSIS — K29.00 ACUTE GASTRITIS WITHOUT HEMORRHAGE, UNSPECIFIED GASTRITIS TYPE: ICD-10-CM

## 2022-12-08 DIAGNOSIS — R11.0 NAUSEA: Primary | ICD-10-CM

## 2022-12-08 DIAGNOSIS — Z00.00 ROUTINE PHYSICAL EXAMINATION: ICD-10-CM

## 2022-12-08 LAB
ALBUMIN SERPL-MCNC: 3.9 G/DL (ref 3.4–5)
ALBUMIN/GLOB SERPL: 1 {RATIO} (ref 1–2)
ALP LIVER SERPL-CCNC: 69 U/L
ALT SERPL-CCNC: 68 U/L
ANION GAP SERPL CALC-SCNC: 8 MMOL/L (ref 0–18)
AST SERPL-CCNC: 50 U/L (ref 15–37)
BILIRUB SERPL-MCNC: 1.1 MG/DL (ref 0.1–2)
BUN BLD-MCNC: 9 MG/DL (ref 7–18)
BUN/CREAT SERPL: 13.2 (ref 10–20)
CALCIUM BLD-MCNC: 9.3 MG/DL (ref 8.5–10.1)
CHLORIDE SERPL-SCNC: 103 MMOL/L (ref 98–112)
CHOLEST SERPL-MCNC: 227 MG/DL (ref ?–200)
CO2 SERPL-SCNC: 27 MMOL/L (ref 21–32)
CREAT BLD-MCNC: 0.68 MG/DL
DEPRECATED RDW RBC AUTO: 41 FL (ref 35.1–46.3)
ERYTHROCYTE [DISTWIDTH] IN BLOOD BY AUTOMATED COUNT: 11.8 % (ref 11–15)
FASTING PATIENT LIPID ANSWER: YES
FASTING STATUS PATIENT QL REPORTED: YES
GFR SERPLBLD BASED ON 1.73 SQ M-ARVRAT: 107 ML/MIN/1.73M2 (ref 60–?)
GLOBULIN PLAS-MCNC: 3.8 G/DL (ref 2.8–4.4)
GLUCOSE BLD-MCNC: 101 MG/DL (ref 70–99)
HCT VFR BLD AUTO: 44.2 %
HDLC SERPL-MCNC: 166 MG/DL (ref 40–59)
HGB BLD-MCNC: 15.2 G/DL
LDLC SERPL CALC-MCNC: 45 MG/DL (ref ?–100)
MCH RBC QN AUTO: 32.8 PG (ref 26–34)
MCHC RBC AUTO-ENTMCNC: 34.4 G/DL (ref 31–37)
MCV RBC AUTO: 95.5 FL
NONHDLC SERPL-MCNC: 61 MG/DL (ref ?–130)
OSMOLALITY SERPL CALC.SUM OF ELEC: 285 MOSM/KG (ref 275–295)
PLATELET # BLD AUTO: 300 10(3)UL (ref 150–450)
POTASSIUM SERPL-SCNC: 3.6 MMOL/L (ref 3.5–5.1)
PROT SERPL-MCNC: 7.7 G/DL (ref 6.4–8.2)
RBC # BLD AUTO: 4.63 X10(6)UL
SODIUM SERPL-SCNC: 138 MMOL/L (ref 136–145)
TRIGL SERPL-MCNC: 101 MG/DL (ref 30–149)
TSI SER-ACNC: 1.29 MIU/ML (ref 0.36–3.74)
VLDLC SERPL CALC-MCNC: 14 MG/DL (ref 0–30)
WBC # BLD AUTO: 5.7 X10(3) UL (ref 4–11)

## 2022-12-08 PROCEDURE — 80053 COMPREHEN METABOLIC PANEL: CPT

## 2022-12-08 PROCEDURE — 36415 COLL VENOUS BLD VENIPUNCTURE: CPT

## 2022-12-08 PROCEDURE — 84443 ASSAY THYROID STIM HORMONE: CPT

## 2022-12-08 PROCEDURE — 3074F SYST BP LT 130 MM HG: CPT | Performed by: FAMILY MEDICINE

## 2022-12-08 PROCEDURE — 99213 OFFICE O/P EST LOW 20 MIN: CPT | Performed by: FAMILY MEDICINE

## 2022-12-08 PROCEDURE — 3078F DIAST BP <80 MM HG: CPT | Performed by: FAMILY MEDICINE

## 2022-12-08 PROCEDURE — 85027 COMPLETE CBC AUTOMATED: CPT

## 2022-12-08 PROCEDURE — 3008F BODY MASS INDEX DOCD: CPT | Performed by: FAMILY MEDICINE

## 2022-12-08 PROCEDURE — 80061 LIPID PANEL: CPT

## 2022-12-08 RX ORDER — PANTOPRAZOLE SODIUM 40 MG/1
40 TABLET, DELAYED RELEASE ORAL
Qty: 30 TABLET | Refills: 2 | Status: SHIPPED | OUTPATIENT
Start: 2022-12-08

## 2023-01-25 ENCOUNTER — NURSE TRIAGE (OUTPATIENT)
Dept: FAMILY MEDICINE CLINIC | Facility: CLINIC | Age: 48
End: 2023-01-25

## 2023-01-25 RX ORDER — NITROFURANTOIN 25; 75 MG/1; MG/1
100 CAPSULE ORAL 2 TIMES DAILY
Qty: 14 CAPSULE | Refills: 0 | Status: SHIPPED | OUTPATIENT
Start: 2023-01-25

## 2023-01-25 NOTE — TELEPHONE ENCOUNTER
Message noted. May start macrobid as requested for UTI symptoms. Erx sent to listed pharmacy.  To follow up for appointment if not better; Please notify patient

## 2025-01-13 NOTE — PLAN OF CARE
A [facility] message was sent to the patient re: results and recommendations as follows:      Dear Mr. Díaz,      I am writing to you to inform you that the biopsies from your stomach were normal. There were no signs of cancer. There were also no signs of H pylori infection. The polyps in your stomach were completely benign; there were no signs of any pre-cancerous changes nor cancer. The biopsies from your swallow pipe (esophagus) showed no signs of eosinophilic esophagitis. Please follow up with your gastroenterologist, Rakan Ayala DO, and your primary care physician, Rob Burch MD, for further evaluation and management.      If you have any questions regarding your endoscopy and/or pathology results, please do not hesitate to contact me at [phone] or by replying to this message.        Sincerely,      Abhishek Dinh MD, KORTNEY   Problem: Patient Centered Care  Goal: Patient preferences are identified and integrated in the patient's plan of care  Description  Interventions:  - What would you like us to know as we care for you?  From home with children  - Provide timely, complete, need for suctioning and perform as needed  - Assess and instruct to report SOB or any respiratory difficulty  - Respiratory Therapy support as indicated  - Manage/alleviate anxiety  - Monitor for signs/symptoms of CO2 retention  1/4/2020 0818 by Mary Jane Duran,

## (undated) DEVICE — 35 ML SYRINGE REGULAR TIP: Brand: MONOJECT

## (undated) DEVICE — SUTURE VICRYL 2-0 UR-6

## (undated) DEVICE — Device: Brand: DEFENDO AIR/WATER/SUCTION AND BIOPSY VALVE

## (undated) DEVICE — SNARE CAPTIFLEX MICRO-OVL OLY

## (undated) DEVICE — CLIP RESOLUTION 235CM

## (undated) DEVICE — ECHOTIP ULTRA: Brand: ECHOTIP

## (undated) DEVICE — Device

## (undated) DEVICE — Device: Brand: JELCO

## (undated) DEVICE — CONMED SCOPE SAVER BITE BLOCK, 20X27 MM: Brand: SCOPE SAVER

## (undated) DEVICE — SOL H2O 3000ML IRRIG

## (undated) DEVICE — SOL  .9 1000ML BTL

## (undated) DEVICE — THORACIC: Brand: MEDLINE INDUSTRIES, INC.

## (undated) DEVICE — DERMABOND LIQUID ADHESIVE

## (undated) DEVICE — FORCEP RADIAL JAW 4

## (undated) DEVICE — GAMMEX® PI HYBRID SIZE 7.5, STERILE POWDER-FREE SURGICAL GLOVE, POLYISOPRENE AND NEOPRENE BLEND: Brand: GAMMEX

## (undated) DEVICE — MEDI-VAC NON-CONDUCTIVE SUCTION TUBING: Brand: CARDINAL HEALTH

## (undated) DEVICE — 3M™ IOBAN™ 2 ANTIMICROBIAL INCISE DRAPE 6650EZ: Brand: IOBAN™ 2

## (undated) DEVICE — ABSORBABLE HEMOSTAT (OXIDIZED REGENERATED CELLULOSE, U.S.P.): Brand: SURGICEL

## (undated) DEVICE — NEEDLE SPINAL 20X3-1/2 YELLOW

## (undated) DEVICE — SUCTION CANISTER, 3000CC,SAFELINER: Brand: DEROYAL

## (undated) DEVICE — SNARE OPTMZ PLPCTM TRP

## (undated) DEVICE — 3 ML SYRINGE LUER-LOCK TIP: Brand: MONOJECT

## (undated) DEVICE — THE ECHELON FLEX POWERED PLUS ARTICULATING ENDOSCOPIC LINEAR CUTTERS ARE STERILE, SINGLE PATIENT USE INSTRUMENTS THAT SIMULTANEOUSLYCUT AND STAPLE TISSUE. THERE ARE SIX STAGGERED ROWS OF STAPLES, THREE ON EITHER SIDE OF THE CUT LINE. THE ECHELON FLEX 45 POWERED PLUSINSTRUMENTS HAVE A STAPLE LINE THAT IS APPROXIMATELY 45 MM LONG AND A CUT LINE THAT IS APPROXIMATELY 42 MM LONG. THE SHAFT CAN ROTATE FREELYIN BOTH DIRECTIONS AND AN ARTICULATION MECHANISM ENABLES THE DISTAL PORTION OF THE SHAFT TO PIVOT TO FACILITATE LATERAL ACCESS TO THE OPERATIVESITE.THE INSTRUMENTS ARE PACKAGED WITH A PRIMARY LITHIUM BATTERY PACK THAT MUST BE INSTALLED PRIOR TO USE. THERE ARE SPECIFIC REQUIREMENTS FORDISPOSING OF THE BATTERY PACK. REFER TO THE BATTERY PACK DISPOSAL SECTION.THE INSTRUMENTS ARE PACKAGED WITHOUT A RELOAD AND MUST BE LOADED PRIOR TO USE. A STAPLE RETAINING CAP ON THE RELOAD PROTECTS THE STAPLE LEGPOINTS DURING SHIPPING AND TRANSPORTATION. THE INSTRUMENTS’ LOCK-OUT FEATURE IS DESIGNED TO PREVENT A USED OR IMPROPERLY INSTALLED RELOADFROM BEING REFIRED OR AN INSTRUMENT FROM BEING FIRED WITHOUT A RELOAD.: Brand: ECHELON FLEX

## (undated) DEVICE — CAUTERY: TIP CLEANER XR 100/CS: Brand: MEDICAL ACTION INDUSTRIES

## (undated) DEVICE — CONTAINER SPEC STR 4OZ GRY LID

## (undated) DEVICE — SUTURE SILK 0 FSL

## (undated) DEVICE — Device: Brand: CUSTOM PROCEDURE KIT

## (undated) DEVICE — THE ECHELON, ECHELON ENDOPATH™ AND ECHELON FLEX™ FAMILIES OF ENDOSCOPIC LINEAR CUTTERS AND RELOADS ARE STERILE, SINGLE PATIENT USE INSTRUMENTS THAT SIMULTANEOUSLY CUT AND STAPLE TISSUE. THERE ARE SIX STAGGERED ROWS OF STAPLES, THREE ON EITHER SIDE OF THE CUT LINE. THE 45 MM INSTRUMENTS HAVE A STAPLE LINE THATIS APPROXIMATELY 45 MM LONG AND A CUT LINE THAT IS APPROXIMATELY 42 MM LONG. THE SHAFT CAN ROTATE FREELY IN BOTH DIRECTIONS AND AN ARTICULATION MECHANISM ON ARTICULATING INSTRUMENTS ENABLES BENDING THE DISTAL PORTIONOF THE SHAFT TO FACILITATE LATERAL ACCESS OF THE OPERATIVE SITE.THE INSTRUMENTS ARE SHIPPED WITHOUT A RELOAD AND MUST BE LOADED PRIOR TO USE. A STAPLE RETAINING CAP ON THE RELOAD PROTECTS THE STAPLE LEG POINTS DURING SHIPPING AND TRANSPORTATION. THE INSTRUMENTS’ LOCK-OUT FEATURE IS DESIGNED TO PREVENT A USED RELOAD FROM BEING REFIRED.: Brand: ECHELON ENDOPATH

## (undated) DEVICE — 6 ML SYRINGE LUER-LOCK TIP: Brand: MONOJECT

## (undated) DEVICE — REM POLYHESIVE ADULT PATIENT RETURN ELECTRODE: Brand: VALLEYLAB

## (undated) DEVICE — UROLOGY DRAIN BAG STERILE

## (undated) DEVICE — LINE MNTR ADLT SET O2 INTMD

## (undated) DEVICE — SUTURE MONOCRYL 4-0 Y935H

## (undated) DEVICE — CYSTO PACK: Brand: MEDLINE INDUSTRIES, INC.

## (undated) DEVICE — ENDOSCOPIC ULTRASOUND FINE NEEDLE BIOPSY (FNB) DEVICE: Brand: ACQUIRE

## (undated) DEVICE — SOL H2O 1000ML BTL

## (undated) DEVICE — KENDALL SCD EXPRESS SLEEVES, KNEE LENGTH, MEDIUM: Brand: KENDALL SCD

## (undated) DEVICE — APPLICATOR COTTON TIP 6\" 2/PK

## (undated) DEVICE — CATH DRAIN 28F HYDRAGLIDE XL

## (undated) DEVICE — DRAIN CHEST DRY ADULT/PED

## (undated) DEVICE — STERILE LATEX POWDER-FREE SURGICAL GLOVESWITH NITRILE COATING: Brand: PROTEXIS

## (undated) DEVICE — ENDOSCOPY PACK - LOWER: Brand: MEDLINE INDUSTRIES, INC.

## (undated) NOTE — LETTER
AUTHORIZATION FOR SURGICAL OPERATION OR OTHER PROCEDURE    1.  I hereby authorize Dr. Nagi Grimes, and [facility] staff assigned to my case to perform the following operation and/or procedure at the [facility]:        ENDOMETRIAL BIOPSY    2.  My ph Relationship to Patient:           []  Parent    Responsible person                          []  Spouse  In case of minor or                    [] Other  _____________   Incompetent name:  __________________________________________________

## (undated) NOTE — ED AVS SNAPSHOT
[facility] Emergency Department    [place] [address]    Phone:  [phone]    Fax:  [phone]           Paul De La Rosa   MRN: [id number]    Department:  [facility] Emergency Department   Date of Visit:  4/14/2 and Class Registration line at [phone] or find a doctor online by visiting [email or web address].    IF THERE IS ANY CHANGE OR WORSENING OF YOUR CONDITION, CALL YOUR PRIMARY CARE PHYSICIAN AT ONCE OR RETURN IMMEDIATELY TO [address] [place] [address].     If

## (undated) NOTE — ED AVS SNAPSHOT
[facility] Emergency Department    [place] [address]     [place] [address]    Phone:  [phone]    Fax:  [phone]           Tee Ornelas   MRN: [id number]    Department:  [facility] Emergency Department   Date of Visit:  3/11/2 and Class Registration line at [phone] or find a doctor online by visiting [email or web address].    IF THERE IS ANY CHANGE OR WORSENING OF YOUR CONDITION, CALL YOUR PRIMARY CARE PHYSICIAN AT ONCE OR RETURN IMMEDIATELY TO [address] [place] [address].     If

## (undated) NOTE — ED AVS SNAPSHOT
[facility] Emergency Department    [address]     [place] [address]    Phone:  [phone]    Fax:  [phone]           Leigh Evans   MRN: [id number]    Department:  [facility] Emergency Department   Date of Visit:  3/11/2 - predniSONE 20 MG Tabs            Discharge References/Attachments     ALLERGIC REACTION, DRUG (ENGLISH)      Disclosure     Insurance plans vary and the physician(s) referred by the ER may not be covered by your plan.  Please contact your insurance compan CARE PHYSICIAN AT ONCE OR RETURN IMMEDIATELY TO THE EMERGENCY DEPARTMENT. If you have been prescribed any medication(s), please fill your prescription right away and begin taking the medication(s) as directed.   If you believe that any of the medications harming yourself, contact [facility] at [phone]. - If you don’t have insurance, Junior Taveras has partnered with Patient [address] to help you get signed up for insurance coverage.   Patient [place]

## (undated) NOTE — Clinical Note
No referring provider defined for this encounter. 02/01/2017        Patient: Doug Arceo   YOB: 1975   Date of Visit: 2/1/2017       Dear  Dr. Brittany Zambrano      Thank you for referring Doug Arceo to my practice.   Please find my as urinary sleep and does not wear pads however again she is noticed new onset bladder pressure pain in the bladder with filling no gross or microscopic hematuria rarely she has had burning and again she has had rare but recurrent urinary tract infection [place] PAST MEDICAL HISTORY:   Interstitial cystitis    REVIEW OF SYSTEMS:  1. The patient denies constitutional symptoms of high fever or weight loss. 2.   No history of glaucoma or sinusitis.   3.   Cardiac history is negative for hypertension, heart attack, the age of 45 2 sibling brothers alive and well therefore family history positive for breast cancer negative for heart disease diabetes mental illness or stroke    UROLOGICAL FAMILY HISTORY: Negative for kidney-bladder cancer  however positive for kidney s ASSESSMENT:  Interstitial cystitis intermediate symptoms however recurrent at the present time I do have long discussion with patient    PLAN:  First and foremost her interstitial cystitis is usually a  problem however she has had good response to hydr

## (undated) NOTE — LETTER
12/8/2022          To Whom It May Concern:    Grant Paul is currently under my medical care. Please excuse the patient from work missed as the patient has been ill. May return to work in office on Monday, 12/12/22. If you require additional information please contact our office.         Sincerely,    Carlton Bello MD          Document generated by:  Carlton Bello MD

## (undated) NOTE — LETTER
9/15/2021          To Whom It May Concern:    Paul De La Rosa is currently under my medical care. Please excuse the patient from work missed recently as the patient has been ill with strep throat.   May return to work on Monday, 9/20/21    If you require add

## (undated) NOTE — IP AVS SNAPSHOT
[facility] HOSP - [facility]    [address], [place], [place] ~ [phone]                Discharge Summary   3/10/2017    [address] [place] [address]           Admission Information        Provider Department    3/10/2017 Antonio Sanches.  MD Veronica [facility] Pre-Op · If you had a nerve block for your surgery, take extra care not to put any pressure on your arm or hand for 24 hours    It is normal:  · For you to have a sore throat if you had a breathing tube during surgery (while you were asleep!).  The sore throat britt stamped envelope. The questionnaire should take no longer than a few minutes to complete and your answers are private. Your health and feedback are important to us!     If you receive a NSQIP questionnaire, and have questions please contact:   Rob Olguin We want to hear from you       We want to hear from you, please share your experience with us by returning the survey you will receive in the mail. Thank you! [employer]     Sign up for [facility], your secure online medical record.   [employer] will allow y

## (undated) NOTE — Clinical Note
Lissette Rivera today and have recommended pelvic floor PT for bladder sx and pelvic floor tension myalgia. I appreciate the opportunity to participate in her care.  Thank you, Roxann Hernandes

## (undated) NOTE — MR AVS SNAPSHOT
[address]  [phone]               Thank you for choosing us for your health care visit with Inderjit Martin MD.  We are glad to serve you and happy to provide you with this summar Enter your [employer] Activation Code exactly as it appears below along with your Zip Code and Date of Birth to complete the sign-up process. If you do not sign up before the expiration date, you must request a new code.     Your unique [employer] Access Code: Maykel Wu

## (undated) NOTE — LETTER
1/3/2019              [facility] [address]        [address]          Dear Jazzy Christopher,      It was a pleasure to see you at our [place] [address] [place] [address] office.   Your lab tests (urine culture) was normal.  There is no

## (undated) NOTE — LETTER
[facility] 4W/SW/SE  [address] [place] [address]  Dept: [phone]  Loc: [phone]      January 4, 2020    Patient: Mira Wakefield   Date of Visit: 12/28/2019       [facility]. [place], [place]. [address]  ?

## (undated) NOTE — LETTER
8/21/2020          To Whom It May Concern:    Audie Aleman is currently under my medical care and may not return to work at this time. Please excuse Zohaib Pena for 4 days. She may return to work on Monday August 24th, 2020.   Activity is restricted as follow

## (undated) NOTE — ED AVS SNAPSHOT
Laila Valerio   MRN: [id number]    Department:  [facility] Emergency Department   Date of Visit:  10/23/2017           Disclosure     Insurance plans vary and the physician(s) referred by the ER may not be covered by your plan.  Please contact y CARE PHYSICIAN AT ONCE OR RETURN IMMEDIATELY TO THE EMERGENCY DEPARTMENT. If you have been prescribed any medication(s), please fill your prescription right away and begin taking the medication(s) as directed.   If you believe that any of the medications

## (undated) NOTE — ED AVS SNAPSHOT
Leigh Evans   MRN: [id number]    Department:  [facility] Emergency Department   Date of Visit:  3/24/2018           Disclosure     Insurance plans vary and the physician(s) referred by the ER may not be covered by your plan.  Please contact yo CARE PHYSICIAN AT ONCE OR RETURN IMMEDIATELY TO THE EMERGENCY DEPARTMENT. If you have been prescribed any medication(s), please fill your prescription right away and begin taking the medication(s) as directed.   If you believe that any of the medications

## (undated) NOTE — ED AVS SNAPSHOT
[facility] Emergency Department    [address]     [place] [address]    Phone:  [phone]    Fax:  [phone]           Chani Jones   MRN: [id number]    Department:  [facility] Emergency Department   Date of Visit:  4/14/2 Commonly known as:  FIORICET, ESGIC   Take 1-2 tablets by mouth every 6 (six) hours as needed for Headaches.        ondansetron 4 MG Tbdp   Quantity:  10 tablet   Commonly known as:  ZOFRAN-ODT   Take 1 tablet (4 mg total) by mouth every 6 (six) hours as ne to a primary care or a specialist physician for a follow-up visit, please tell this physician (or your personal doctor if your instructions are to return to your personal doctor) about any new or lasting problems.  The primary care or specialist physician m [place]  [address] [employer] ([address]) [facility] [employer] ([address]) [place] [address] ([facility]) [phone]   Elizabeth Ellison [address] [employer]  [facility] & medical emergencies, dial 911.

## (undated) NOTE — LETTER
No referring provider defined for this encounter. 02/06/18        Patient: Harriet Rosales   YOB: 1975   Date of Visit: 2/6/2018       Dear  Dr. Hermann Rios MD,      Thank you for referring Harriet Rosales to my practice.   Please find my

## (undated) NOTE — LETTER
[address], [place], [place]     AUTHORIZATION FOR SURGICAL OPERATION OR PROCEDURE    1.  I hereby authorize Dr. Gisela Reese, my Physician(s) and whomever may be designated as the doctor's Assistant, to perform the following op 5. I consent to the photographing of procedure(s) to be performed for the purposes of advancing medicine, science and/or education, provided my identity is not revealed.  If the procedure has been videotaped, the physician/surgeon will obtain the original v (Witness signature)                                                                                                  (Date)                                (Time)  STATEMENT OF PHYSICIAN My signature below affirms that prior to the time of the procedure;  I

## (undated) NOTE — LETTER
[address] [address]  [phone]                11/16/17      [address]  [address]        Dear Joce Souza,    I reviewed the pathology rep

## (undated) NOTE — LETTER
March 11, 2017    Patient: Maddie Castrejon   Date of Visit: 3/11/2017       To Whom It May Concern:    Modesta Seo was seen and treated in our emergency department on 3/11/2017. She should not return to work until 03/14/17.     If you have any questions o

## (undated) NOTE — Clinical Note
February 2, 2017         Mini Flower MD  [address] [place] [address]      UROLOGICAL CONSULTATION  Patient: Leigh Evans   YOB: 1975   Date of Visit: 2/1/2017       Dear Dr. Kan Henderson MD,    CASE SUMMARY:   The patient is culture by primary care physician December 17, 2016 with a normal urinalysis at the same time patient is sexually active she states at times she can have mild pain with intercourse not usually though she states she has not had       new onset sexual [place] failure or hypercholesterolemia. No history of valvular heart disease, arrhythmias, rheumatic fever, dvt or PE, chest pain, shortness of breath or ankle swelling. The patient exercises regularly.   4.   The patient denies pulmonary complaints of cough, as 02/01/17  0825   BP: 113/72   Pulse: 82   Temp: 98 °F (36.7 °C)   TempSrc: Oral   Resp: 16   Height: 5' 7\" (1.702 m)   Weight: 152 lb (68.947 kg)       The patient is a well-developed, well-nourished, white female in no apparent distress, appropriate for be on lifelong medication and did have hydrodistention with good results in 2008 or 9 and then repeated 2013 asymptomatic for 3 years and now he has recurrence at length we discussed the benefits risks complications side effects reasons for nature of alter

## (undated) NOTE — MR AVS SNAPSHOT
[facility] - [place] [address] [place]  [id number] [phone]               Thank you for choosing us for your health care visit with Megan Pacheco MD.  We are glad to serve you and happy to provide you with this summary o med list.                MULTIVITAMINS Caps   Take  by mouth. Nitrofurantoin Monohyd Macro 100 MG Caps   Take 1 capsule (100 mg total) by mouth nightly.    Commonly known as:  MACROBID           ondansetron 8 MG Tbdp   Take 1 tablet (8 mg total) b

## (undated) NOTE — LETTER
Patient Name: Madonna Brower  : 1975  MRN: [id number]  Patient Address: [address] [place] [address] is committed to the safety and well-being of our patients, members, employees, and treatments, when available and appropriate, should be given as soon as possible after diagnosis.       Seek Further Care      If you are awaiting test results or are confirmed positive for COVID-19, and your symptoms worsen at home with symptoms such as: ex household cleaning sprays or wipes according to the label instructions. Post-Discharge Follow-up  Please call your primary care provider within two days of your discharge to arrange for a telehealth follow-up.  CDC does not recommend repeat testing Prevention (CDC)  10 things you can do to manage your health at home, Deborahchange.nl. pdf  [employer]cy  ht

## (undated) NOTE — LETTER
1/14/2022      To Whom It May Concern:    Madonna Brower have been currently under my medical care. She is now symptom free and may return to work on 1/17/22.    Activity is restricted as follows: none    If you require additional information please co

## (undated) NOTE — LETTER
9/16/2020    [place], [place]MA [address] [place] [address]            Dear Alana Tavarez,      Our records indicate that you are due for an appointment for a Colonoscopy with Alton Zhu MD.    Please call our office to schedule this